# Patient Record
Sex: FEMALE | NOT HISPANIC OR LATINO | Employment: FULL TIME | URBAN - METROPOLITAN AREA
[De-identification: names, ages, dates, MRNs, and addresses within clinical notes are randomized per-mention and may not be internally consistent; named-entity substitution may affect disease eponyms.]

---

## 2017-06-14 ENCOUNTER — HOSPITAL ENCOUNTER (EMERGENCY)
Facility: HOSPITAL | Age: 42
Discharge: HOME | End: 2017-06-14
Attending: EMERGENCY MEDICINE
Payer: COMMERCIAL

## 2017-06-14 ENCOUNTER — APPOINTMENT (EMERGENCY)
Dept: ULTRASOUND IMAGING | Facility: HOSPITAL | Age: 42
End: 2017-06-14
Payer: COMMERCIAL

## 2017-06-14 VITALS
DIASTOLIC BLOOD PRESSURE: 72 MMHG | TEMPERATURE: 97.8 F | HEART RATE: 87 BPM | HEIGHT: 64 IN | SYSTOLIC BLOOD PRESSURE: 134 MMHG | WEIGHT: 144 LBS | BODY MASS INDEX: 24.59 KG/M2 | RESPIRATION RATE: 18 BRPM | OXYGEN SATURATION: 100 %

## 2017-06-14 DIAGNOSIS — M79.605 ACUTE PAIN OF LEFT LOWER EXTREMITY: Primary | ICD-10-CM

## 2017-06-14 LAB
ANION GAP BLD CALC-SCNC: 16 MMOL/L (ref 4–13)
BUN BLD-MCNC: 16 MG/DL (ref 5–25)
CA-I BLD-SCNC: 1.19 MMOL/L (ref 1.12–1.32)
CHLORIDE BLD-SCNC: 104 MMOL/L (ref 100–108)
CREAT BLD-MCNC: 0.6 MG/DL (ref 0.6–1.3)
DEPRECATED D DIMER PPP: 440 NG/ML (FEU) (ref 0–424)
GFR SERPL CREATININE-BSD FRML MDRD: >60 ML/MIN/1.73SQ M
GLUCOSE SERPL-MCNC: 136 MG/DL (ref 65–140)
HCT VFR BLD CALC: 37 % (ref 34.8–46.1)
HGB BLDA-MCNC: 12.6 G/DL (ref 11.5–15.4)
PCO2 BLD: 25 MMOL/L (ref 21–32)
POTASSIUM BLD-SCNC: 4 MMOL/L (ref 3.5–5.3)
SODIUM BLD-SCNC: 140 MMOL/L (ref 136–145)
SPECIMEN SOURCE: ABNORMAL

## 2017-06-14 PROCEDURE — 36415 COLL VENOUS BLD VENIPUNCTURE: CPT | Performed by: PHYSICIAN ASSISTANT

## 2017-06-14 PROCEDURE — 80047 BASIC METABLC PNL IONIZED CA: CPT

## 2017-06-14 PROCEDURE — 93971 EXTREMITY STUDY: CPT

## 2017-06-14 PROCEDURE — 99284 EMERGENCY DEPT VISIT MOD MDM: CPT

## 2017-06-14 PROCEDURE — 85014 HEMATOCRIT: CPT

## 2017-06-14 PROCEDURE — 85379 FIBRIN DEGRADATION QUANT: CPT | Performed by: PHYSICIAN ASSISTANT

## 2017-06-16 ENCOUNTER — ALLSCRIPTS OFFICE VISIT (OUTPATIENT)
Dept: OTHER | Facility: OTHER | Age: 42
End: 2017-06-16

## 2017-07-13 ENCOUNTER — TRANSCRIBE ORDERS (OUTPATIENT)
Dept: ADMINISTRATIVE | Facility: HOSPITAL | Age: 42
End: 2017-07-13

## 2017-07-13 ENCOUNTER — APPOINTMENT (OUTPATIENT)
Dept: LAB | Facility: HOSPITAL | Age: 42
End: 2017-07-13

## 2017-07-13 DIAGNOSIS — Z00.8 HEALTH EXAMINATION IN POPULATION SURVEY: Primary | ICD-10-CM

## 2017-07-13 DIAGNOSIS — Z00.8 HEALTH EXAMINATION IN POPULATION SURVEY: ICD-10-CM

## 2017-07-13 LAB
CHOLEST SERPL-MCNC: 218 MG/DL (ref 50–200)
EST. AVERAGE GLUCOSE BLD GHB EST-MCNC: 120 MG/DL
HBA1C MFR BLD: 5.8 % (ref 4.2–6.3)
HDLC SERPL-MCNC: 44 MG/DL (ref 40–60)
LDLC SERPL CALC-MCNC: 159 MG/DL (ref 0–100)
TRIGL SERPL-MCNC: 77 MG/DL

## 2017-07-13 PROCEDURE — 80061 LIPID PANEL: CPT

## 2017-07-13 PROCEDURE — 83036 HEMOGLOBIN GLYCOSYLATED A1C: CPT

## 2017-07-13 PROCEDURE — 36415 COLL VENOUS BLD VENIPUNCTURE: CPT

## 2017-08-14 ENCOUNTER — ALLSCRIPTS OFFICE VISIT (OUTPATIENT)
Dept: OTHER | Facility: OTHER | Age: 42
End: 2017-08-14

## 2017-09-26 ENCOUNTER — OFFICE VISIT (OUTPATIENT)
Dept: URGENT CARE | Facility: MEDICAL CENTER | Age: 42
End: 2017-09-26
Payer: COMMERCIAL

## 2017-09-26 PROCEDURE — 99213 OFFICE O/P EST LOW 20 MIN: CPT

## 2017-09-26 PROCEDURE — S9088 SERVICES PROVIDED IN URGENT: HCPCS

## 2017-10-13 DIAGNOSIS — D25.9 LEIOMYOMA OF UTERUS: ICD-10-CM

## 2017-10-13 DIAGNOSIS — Z12.31 ENCOUNTER FOR SCREENING MAMMOGRAM FOR MALIGNANT NEOPLASM OF BREAST: ICD-10-CM

## 2017-10-13 DIAGNOSIS — R10.2 PELVIC AND PERINEAL PAIN: ICD-10-CM

## 2017-10-13 DIAGNOSIS — D64.9 ANEMIA: ICD-10-CM

## 2017-10-13 DIAGNOSIS — R73.03 PREDIABETES: ICD-10-CM

## 2017-10-20 ENCOUNTER — GENERIC CONVERSION - ENCOUNTER (OUTPATIENT)
Dept: FAMILY MEDICINE CLINIC | Facility: CLINIC | Age: 42
End: 2017-10-20

## 2017-10-25 ENCOUNTER — TRANSCRIBE ORDERS (OUTPATIENT)
Dept: LAB | Facility: HOSPITAL | Age: 42
End: 2017-10-25

## 2017-10-25 ENCOUNTER — APPOINTMENT (OUTPATIENT)
Dept: LAB | Facility: HOSPITAL | Age: 42
End: 2017-10-25
Attending: FAMILY MEDICINE
Payer: COMMERCIAL

## 2017-10-25 DIAGNOSIS — R73.03 PREDIABETES: ICD-10-CM

## 2017-10-25 DIAGNOSIS — H93.A1 PULSATILE TINNITUS OF RIGHT EAR: Primary | ICD-10-CM

## 2017-10-25 DIAGNOSIS — H93.A1 PULSATILE TINNITUS OF RIGHT EAR: ICD-10-CM

## 2017-10-25 LAB
ALBUMIN SERPL BCP-MCNC: 3.4 G/DL (ref 3.5–5)
ALP SERPL-CCNC: 87 U/L (ref 46–116)
ALT SERPL W P-5'-P-CCNC: 23 U/L (ref 12–78)
ANION GAP SERPL CALCULATED.3IONS-SCNC: 7 MMOL/L (ref 4–13)
AST SERPL W P-5'-P-CCNC: 18 U/L (ref 5–45)
BASOPHILS # BLD AUTO: 0.06 THOUSANDS/ΜL (ref 0–0.1)
BASOPHILS NFR BLD AUTO: 1 % (ref 0–1)
BILIRUB SERPL-MCNC: 0.3 MG/DL (ref 0.2–1)
BUN SERPL-MCNC: 14 MG/DL (ref 5–25)
CALCIUM SERPL-MCNC: 8.6 MG/DL (ref 8.3–10.1)
CHLORIDE SERPL-SCNC: 105 MMOL/L (ref 100–108)
CHOLEST SERPL-MCNC: 178 MG/DL (ref 50–200)
CO2 SERPL-SCNC: 27 MMOL/L (ref 21–32)
CREAT SERPL-MCNC: 0.69 MG/DL (ref 0.6–1.3)
EOSINOPHIL # BLD AUTO: 0.13 THOUSAND/ΜL (ref 0–0.61)
EOSINOPHIL NFR BLD AUTO: 3 % (ref 0–6)
ERYTHROCYTE [DISTWIDTH] IN BLOOD BY AUTOMATED COUNT: 16.5 % (ref 11.6–15.1)
EST. AVERAGE GLUCOSE BLD GHB EST-MCNC: 134 MG/DL
GFR SERPL CREATININE-BSD FRML MDRD: 123 ML/MIN/1.73SQ M
GLUCOSE P FAST SERPL-MCNC: 95 MG/DL (ref 65–99)
HBA1C MFR BLD: 6.3 % (ref 4.2–6.3)
HCT VFR BLD AUTO: 30 % (ref 34.8–46.1)
HDLC SERPL-MCNC: 46 MG/DL (ref 40–60)
HGB BLD-MCNC: 9.3 G/DL (ref 11.5–15.4)
LDLC SERPL CALC-MCNC: 118 MG/DL (ref 0–100)
LYMPHOCYTES # BLD AUTO: 1.47 THOUSANDS/ΜL (ref 0.6–4.47)
LYMPHOCYTES NFR BLD AUTO: 31 % (ref 14–44)
MCH RBC QN AUTO: 20.9 PG (ref 26.8–34.3)
MCHC RBC AUTO-ENTMCNC: 31 G/DL (ref 31.4–37.4)
MCV RBC AUTO: 67 FL (ref 82–98)
MONOCYTES # BLD AUTO: 0.42 THOUSAND/ΜL (ref 0.17–1.22)
MONOCYTES NFR BLD AUTO: 9 % (ref 4–12)
NEUTROPHILS # BLD AUTO: 2.61 THOUSANDS/ΜL (ref 1.85–7.62)
NEUTS SEG NFR BLD AUTO: 56 % (ref 43–75)
NRBC BLD AUTO-RTO: 0 /100 WBCS
PLATELET # BLD AUTO: 410 THOUSANDS/UL (ref 149–390)
PMV BLD AUTO: 10.8 FL (ref 8.9–12.7)
POTASSIUM SERPL-SCNC: 4.2 MMOL/L (ref 3.5–5.3)
PROT SERPL-MCNC: 7.6 G/DL (ref 6.4–8.2)
RBC # BLD AUTO: 4.46 MILLION/UL (ref 3.81–5.12)
SODIUM SERPL-SCNC: 139 MMOL/L (ref 136–145)
TRIGL SERPL-MCNC: 72 MG/DL
TSH SERPL DL<=0.05 MIU/L-ACNC: 1.68 UIU/ML (ref 0.36–3.74)
WBC # BLD AUTO: 4.7 THOUSAND/UL (ref 4.31–10.16)

## 2017-10-25 PROCEDURE — 80061 LIPID PANEL: CPT

## 2017-10-25 PROCEDURE — 84443 ASSAY THYROID STIM HORMONE: CPT

## 2017-10-25 PROCEDURE — 80053 COMPREHEN METABOLIC PANEL: CPT

## 2017-10-25 PROCEDURE — 83036 HEMOGLOBIN GLYCOSYLATED A1C: CPT

## 2017-10-25 PROCEDURE — 36415 COLL VENOUS BLD VENIPUNCTURE: CPT

## 2017-10-25 PROCEDURE — 85025 COMPLETE CBC W/AUTO DIFF WBC: CPT

## 2017-10-27 ENCOUNTER — ALLSCRIPTS OFFICE VISIT (OUTPATIENT)
Dept: OTHER | Facility: OTHER | Age: 42
End: 2017-10-27

## 2017-10-27 PROCEDURE — 87086 URINE CULTURE/COLONY COUNT: CPT | Performed by: NURSE PRACTITIONER

## 2017-10-28 ENCOUNTER — HOSPITAL ENCOUNTER (OUTPATIENT)
Dept: ULTRASOUND IMAGING | Facility: HOSPITAL | Age: 42
Discharge: HOME | End: 2017-10-28
Attending: OBSTETRICS & GYNECOLOGY
Payer: COMMERCIAL

## 2017-10-28 ENCOUNTER — LAB REQUISITION (OUTPATIENT)
Dept: LAB | Facility: HOSPITAL | Age: 42
End: 2017-10-28
Payer: COMMERCIAL

## 2017-10-28 DIAGNOSIS — D25.9 LEIOMYOMA OF UTERUS: ICD-10-CM

## 2017-10-28 DIAGNOSIS — R35.0 FREQUENCY OF MICTURITION: ICD-10-CM

## 2017-10-28 PROCEDURE — 76856 US EXAM PELVIC COMPLETE: CPT

## 2017-10-28 NOTE — PROGRESS NOTES
Assessment  1  Fibroids (218 9) (D25 9)   2  Anemia (285 9) (D64 9)   3  Urinary frequency (788 41) (R35 0)    Plan  Anemia    · (1) FOLATE; Status:Active; Requested ZCK:[id number];    Perform:[facility]; NWY:[id number]; Ordered; Edda Guzman; Ordered By:Roel Kaufman;   · (1) IRON PANEL; Status:Active; Requested EJW:[id number];    Perform:[facility]; JCB:[id number]; Ordered; Selmal Plank; Ordered By:Roel Kaufman;   · (1) OCCULT BLOOD,FECES(SCREEN); Status:Active; Requested XDF:[id number];    Perform:[facility]; KSR:84YFV4232; Ordered; Edda Guzman; Ordered By:Roel Kaufman;   · (1) VITAMIN B12; Status:Active; Requested VYE:[id number];    Perform:[facility]; KSY:[id number]; Ordered; Selmal Plank; Ordered By:Roel Kaufman;  Fibroids    · * US PELVIS COMPLETE (TRANSABDOMINAL AND TRANSVAGINAL); Status:Hold For -  Scheduling; Requested VRH:[id number];    Perform:[facility]; BTP:[id number]; Ordered; For:Fibroids; Ordered By:Lisa Teixeira;  Urinary frequency    · (1) URINE CULTURE; Source:Urine, Clean Catch; Status: In Progress - Specimen/Data  Collected;   Done: 68LLD5116   Perform:[facility]; QCF:[id number]; Ordered; For:Urinary frequency; Ordered By:Mata Teixeira; Discussion/Summary  Goals and Barriers: The patient has the current Goals: Improve anemia  The patent has the current Barriers: Patient has large fibroid  Patient's Capacity to Self-Care: Patient is able to Self-Care  Medication SE Review and Pt Understands Tx: Possible side effects of new medications were reviewed with the patient/guardian today  The treatment plan was reviewed with the patient/guardian  The patient/guardian understands and agrees with the treatment plan   Self Referrals:   Self Referrals: Yes      Chief Complaint  Chief Complaint Free Text Note Form: Patient here for fibroids stating periods are heavy the first 2 -3 days lasting 4-7 days        History of Present Illness  HPI: Pleasant 18-year-old here for discussion of fibroids  She had ultrasound done at [facility] last year which showed a 5 6 cm fibroid  She has been having flooding and pronounced anemia recently  She is interested in definitive treatment but would like to keep her uterus and avoid hormones  She also has been having increased urinary frequency which she attributes to the enlarged fibroid  Review of Systems  Focused-Female:   Constitutional: No fever, no chills, feels well, no tiredness, no recent weight gain or loss  Gastrointestinal: no complaints of abdominal pain, no constipation, no nausea or diarrhea, no vomiting, no bloody stools  Genitourinary: no complaints of dysuria, no incontinence, no pelvic pain, no dysmenorrhea, no vaginal discharge or abnormal vaginal bleeding  ROS Reviewed:   ROS reviewed  Active Problems  1  Anemia (285 9) (D64 9)   2  Fibroids (218 9) (D25 9)   3  Hypercholesteremia (272 0) (E78 00)   4  Lesion of lip (528 5) (K13 0)   5  Pelvic pain in female (625 9) (R10 2)   6  Pre-diabetes (790 29) (R73 03)   7  Pulsatile tinnitus of right ear (388 30) (H93 A1)    Past Medical History  1  History of sickle cell trait (V12 3) (Z86 2)  Active Problems And Past Medical History Reviewed: The active problems and past medical history were reviewed and updated today  Surgical History  1  History of Hernia Repair  Surgical History Reviewed: The surgical history was reviewed and updated today  Family History  Mother    1  Family history of thyroid disease (V18 19) (Z83 49)  Father    2  Family history of diabetes mellitus (V18 0) (Z83 3)   3  Family history of hypertension (V17 49) (Z82 49)  Son    4  Family history of asthma (V17 5) (Z82 5)   5  Family history of eczema (V19 4) (Z84 0)  Family History Reviewed: The family history was reviewed and updated today    Patient denies family history of breast, colon or GYN cancers      Social History   · Never a smoker   · Not currently sexually active   · Social alcohol use (Z78 9)  Social History Reviewed: The social history was reviewed and updated today  Patient works in the [employer]      Current Meds   1  Flonase SUSP; Therapy: (Recorded:27Oct2017) to Recorded   2  Multi-Vitamin TABS; Therapy: (Recorded:24Kgo8631) to Recorded  Medication List Reviewed: The medication list was reviewed and updated today  Allergies  1  No Known Drug Allergies    Vitals  Vital Signs    Recorded: 45VTX1632 74:60SJ   Systolic 485, RUE, Sitting   Diastolic 80, RUE, Sitting   Height 5 ft 3 in   Weight 137 lb    BMI Calculated 24 27   BSA Calculated 1 65   LMP 16Oct2017     Physical Exam    Constitutional   General appearance: No acute distress, well appearing and well nourished  Pulmonary   Respiratory effort: No increased work of breathing or signs of respiratory distress  Genitourinary   External genitalia: Normal and no lesions appreciated  Vagina: Normal, no lesions or dryness appreciated  Urethral meatus: Normal     Bladder: Normal, soft, non-tender and no prolapse or masses appreciated  Cervix: Normal, no palpable masses  Uterus: Abnormal  -- Enlarged with large fibroid out  Adnexa/parametria: Normal, non-tender and no fullness or masses appreciated      Psychiatric   Orientation to person, place, and time: Normal     Mood and affect: Normal        Results/Data  (1) COMPREHENSIVE METABOLIC PANEL 45VCK9607 25:16YQ [facility] Standard    Order Number: [id number]     Test Name Result Flag Reference   SODIUM 139 mmol/L  136-145   POTASSIUM 4 2 mmol/L  3 5-5 3   CHLORIDE 105 mmol/L  100-108   CARBON DIOXIDE 27 mmol/L  21-32   ANION GAP (CALC) 7 mmol/L  4-13   BLOOD UREA NITROGEN 14 mg/dL  5-25   CREATININE 0 69 mg/dL  0 60-1 30   Standardized to IDMS reference method   CALCIUM 8 6 mg/dL  8 3-10 1   BILI, TOTAL 0 30 mg/dL  0 20-1 00   ALK PHOSPHATAS 87 U/L     ALT (SGPT) 23 U/L  12-78   Specimen collection should occur prior to Sulfasalazine administration due to the potential for falsely depressed results  AST(SGOT) 18 U/L  5-45   Specimen collection should occur prior to Sulfasalazine administration due to the potential for falsely depressed results  ALBUMIN 3 4 g/dL L 3 5-5 0   TOTAL PROTEIN 7 6 g/dL  6 4-8 2   eGFR 123 ml/min/1 73sq m     [facility] Disease Education Program recommendations are as follows:  GFR calculation is accurate only with a steady state creatinine  Chronic Kidney disease less than 60 ml/min/1 73 sq  meters  Kidney failure less than 15 ml/min/1 73 sq  meters  GLUCOSE FASTING 95 mg/dL  65-99   Specimen collection should occur prior to Sulfasalazine administration due to the potential for falsely depressed results  Specimen collection should occur prior to Sulfapyridine administration due to the potential for falsely elevated results  (1) HEMOGLOBIN A1C 25Oct2017 06:31AM Shana Rg    Order Number: [id number]     Test Name Result Flag Reference   HEMOGLOBIN A1C 6 3 %  4 2-6 3   EST  AVG  GLUCOSE 134 mg/dl       (1) LIPID PANEL, FASTING 71ZDJ0496 06:31AM Shana Rg    Order Number: [id number]     Test Name Result Flag Reference   CHOLESTEROL 178 mg/dL     HDL,DIRECT 46 mg/dL  40-60   Specimen collection should occur prior to Metamizole administration due to the potential for falsley depressed results  LDL CHOLESTEROL CALCULATED 118 mg/dL H 0-100   Triglyceride:        Normal <150 mg/dl   Borderline High 150-199 mg/dl   High 200-499 mg/dl   Very High >499 mg/dl      Cholesterol:       Desirable <200 mg/dl    Borderline High 200-239 mg/dl    High >239 mg/dl      HDL Cholesterol:       High>59 mg/dL    Low <41 mg/dL      This screening LDL is a calculated result  It does not have the accuracy of the Direct Measured LDL in the monitoring of patients with hyperlipidemia and/or statin therapy     Direct Measure LDL (XDW967) must be ordered separately in these patients  TRIGLYCERIDES 72 mg/dL  <=150   Specimen collection should occur prior to N-Acetylcysteine or Metamizole administration due to the potential for falsely depressed results         Signatures   Electronically signed by : Sanket Wolf; Oct 27 2017 11:13AM EST                       (Author)    Electronically signed by : Carmen Fine MD; Oct 27 2017 11:14AM EST

## 2017-10-29 LAB — BACTERIA UR CULT: NORMAL

## 2017-10-30 ENCOUNTER — GENERIC CONVERSION - ENCOUNTER (OUTPATIENT)
Dept: OTHER | Facility: OTHER | Age: 42
End: 2017-10-30

## 2017-10-31 ENCOUNTER — GENERIC CONVERSION - ENCOUNTER (OUTPATIENT)
Dept: OTHER | Facility: OTHER | Age: 42
End: 2017-10-31

## 2017-11-03 ENCOUNTER — ALLSCRIPTS OFFICE VISIT (OUTPATIENT)
Dept: OTHER | Facility: OTHER | Age: 42
End: 2017-11-03

## 2017-11-04 ENCOUNTER — APPOINTMENT (OUTPATIENT)
Dept: LAB | Facility: HOSPITAL | Age: 42
End: 2017-11-04
Attending: FAMILY MEDICINE
Payer: COMMERCIAL

## 2017-11-04 DIAGNOSIS — D64.9 ANEMIA: ICD-10-CM

## 2017-11-04 LAB
FERRITIN SERPL-MCNC: 2 NG/ML (ref 8–388)
FOLATE SERPL-MCNC: >20 NG/ML (ref 3.1–17.5)
IRON SATN MFR SERPL: 4 %
IRON SERPL-MCNC: 19 UG/DL (ref 50–170)
TIBC SERPL-MCNC: 423 UG/DL (ref 250–450)
VIT B12 SERPL-MCNC: 405 PG/ML (ref 100–900)

## 2017-11-04 PROCEDURE — 36415 COLL VENOUS BLD VENIPUNCTURE: CPT

## 2017-11-04 PROCEDURE — 83540 ASSAY OF IRON: CPT

## 2017-11-04 PROCEDURE — 82746 ASSAY OF FOLIC ACID SERUM: CPT

## 2017-11-04 PROCEDURE — 82728 ASSAY OF FERRITIN: CPT

## 2017-11-04 PROCEDURE — 83550 IRON BINDING TEST: CPT

## 2017-11-04 PROCEDURE — 82607 VITAMIN B-12: CPT

## 2017-11-04 NOTE — PROGRESS NOTES
Assessment  1  Anemia (285 9) (D64 9)    Discussion/Summary    Anemia- unclear etiology, B- 12, Iron, Folic acid, Occult blood ordered  If all negative consider Bleeding related to Uterine fibroids she was advised to follow up with OB/Gyn was advised increasing iron in her diet and increase meat consumption as it might help her anemia  up in 1 month  Possible side effects of new medications were reviewed with the patient/guardian today  The treatment plan was reviewed with the patient/guardian  The patient/guardian understands and agrees with the treatment plan      Chief Complaint  Patient seen in office today for a follow up appointment with review of labs - no new c/o  History of Present Illness  Patient is here to follow up for blood work showing anemia, she says that she has been found to be anemic in the past  She has a history of sickle cell anemia trait  She denies any blood in the urine or in the stool  She has a history of a fibroid and does have a lot of bleeding associated with the fibroid  She says that her her periods are very heavy and out of control  She says that at one point her periods were lasting 7-10 days  Review of Systems    Constitutional: as noted in HPI  Eyes: No complaints of eye pain, no red eyes, no eyesight problems, no discharge, no dry eyes, no itching of eyes  Cardiovascular: No complaints of slow heart rate, no fast heart rate, no chest pain, no palpitations, no leg claudication, no lower extremity edema  Respiratory: No complaints of shortness of breath, no wheezing, no cough, no SOB on exertion, no orthopnea, no PND  Gastrointestinal: No complaints of abdominal pain, no constipation, no nausea or vomiting, no diarrhea, no bloody stools  Genitourinary: No complaints of dysuria, no incontinence, no pelvic pain, no dysmenorrhea, no vaginal discharge or bleeding     Musculoskeletal: No complaints of arthralgias, no myalgias, no joint swelling or stiffness, no limb pain or swelling  Integumentary: No complaints of skin rash or lesions, no itching, no skin wounds, no breast pain or lump  Endocrine: No complaints of proptosis, no hot flashes, no muscle weakness, no deepening of the voice, no feelings of weakness  Hematologic/Lymphatic: as noted in HPI  ROS reviewed  Active Problems  1  Anemia (285 9) (D64 9)   2  Fibroids (218 9) (D25 9)   3  Hypercholesteremia (272 0) (E78 00)   4  Lesion of lip (528 5) (K13 0)   5  Pelvic pain in female (625 9) (R10 2)   6  Pre-diabetes (790 29) (R73 03)   7  Pulsatile tinnitus of right ear (388 30) (H93 A1)   8  Urinary frequency (788 41) (R35 0)    Past Medical History  1  History of sickle cell trait (V12 3) (Z86 2)    The active problems and past medical history were reviewed and updated today  Surgical History  1  History of Hernia Repair    Family History  Mother    1  Family history of thyroid disease (V18 19) (Z83 49)  Father    2  Family history of diabetes mellitus (V18 0) (Z83 3)   3  Family history of hypertension (V17 49) (Z82 49)  Son    4  Family history of asthma (V17 5) (Z82 5)   5  Family history of eczema (V19 4) (Z84 0)    Social History   · Never a smoker   · Not currently sexually active   · Social alcohol use (Z78 9)    Current Meds   1  Multi-Vitamin TABS; Therapy: (Recorded:62Ivw5934) to Recorded    Allergies  1  No Known Drug Allergies    Vitals  Vital Signs    Recorded: 80YKO8687 08:13AM   Temperature 97 8 F   Heart Rate 91   Systolic 044   Diastolic 78   Height 5 ft 3 in   Weight 137 lb    BMI Calculated 24 27   BSA Calculated 1 65   O2 Saturation 100     Physical Exam    Constitutional   General appearance: No acute distress, well appearing and well nourished  Eyes   Conjunctiva and lids: No swelling, erythema or discharge  Pulmonary   Respiratory effort: No increased work of breathing or signs of respiratory distress  Auscultation of lungs: Clear to auscultation  Cardiovascular   Auscultation of heart: Normal rate and rhythm, normal S1 and S2, without murmurs  Musculoskeletal   Gait and station: Normal     Skin   Skin and subcutaneous tissue: Normal without rashes or lesions      Psychiatric   Orientation to person, place, and time: Normal     Mood and affect: Normal          Future Appointments    Date/Time Provider Specialty Site   11/06/2017 09:45 AM Amanda James RD, LDN Diabetes Educator [facility]   12/04/2017 11:15 AM Sherrie Werner MD Obstetrics/Gynecology [facility]     Signatures   Electronically signed by : Cruz Trevizo MD; Nov  3 2017  8:36AM EST                       (Author)

## 2017-12-04 ENCOUNTER — ALLSCRIPTS OFFICE VISIT (OUTPATIENT)
Dept: OTHER | Facility: OTHER | Age: 42
End: 2017-12-04

## 2017-12-04 DIAGNOSIS — N92.0 EXCESSIVE AND FREQUENT MENSTRUATION WITH REGULAR CYCLE: ICD-10-CM

## 2017-12-04 PROCEDURE — 88305 TISSUE EXAM BY PATHOLOGIST: CPT | Performed by: OBSTETRICS & GYNECOLOGY

## 2017-12-05 NOTE — PROGRESS NOTES
Assessment    1  Fibroids (218 9) (D25 9)   2  Menorrhagia with regular cycle (626 2) (N92 0)   3  Anemia (285 9) (D64 9)    Discussion/Summary  Discussion Summary:   Reviewed treatment options for menorrhagia  Discussed hormonal treatment such as birth control pills and Depo-Provera through surgical treatments such as D&C hysteroscopy with ablation, hysterectomy and uterine artery embolization  At this time the patient would like to avoid hormonal treatments  And is not interested in hysterectomy  After review of risks and benefits of all above options patient would like to proceed with a D&C hysteroscopy with NovaSure ablation  D&C Hysteroscopy Novasure ablationCBCnoneprophylaxis bilateral compression boots  Counseling Documentation With Imm: The patient was counseled regarding diagnostic results,-- instructions for management,-- prognosis,-- risks and benefits of treatment options  total time of encounter was 30 minutes-- and-- 20 minutes was spent counseling  Goals and Barriers: The patient has the current Goals: Treatment of menorrhagia  The patent has the current Barriers: None  Patient's Capacity to Self-Care: Patient is able to Self-Care  Patient Education: Educational resources provided: [employer] educational pamphlets-fibroids, hysterectomy and NovaSure ablation  [facility] Infection prevention pamphlet  Medication SE Review and Pt Understands Tx: Possible side effects of new medications were reviewed with the patient/guardian today  The treatment plan was reviewed with the patient/guardian  The patient/guardian understands and agrees with the treatment plan   Self Referrals:   Self Referrals: No      Chief Complaint  Chief Complaint Free Text Note Form: Patient here for consult:fibroids      History of Present Illness  HPI: 35year old  with menorrhagia  Was evaluated 1 month ago and an ultrasound was obtained showing 7cm fundal fibroid  Here to discuss options        Review of Systems   Female ROS: dysmenorrhea-- and-- menorrhagia, but-- no pelvic pain,-- no decreased time between periods,-- periods are regular-- and-- regular length of periods--   The patient presents with complaints of gradual onset of 1 episode of severe nonmenstrual bleeding, described as heavy starting 2 years ago  Episodes have been occurring 1 time a month, each episode lasting 5 days  She is currently experiencing nonmenstrual bleeding  Her symptoms are caused by no known event  Symptoms are improved by lying down and reduced activity, but not by stress management and heating pad  Symptoms are made worse by strenuous activity and prolonged standing, but not by intercourse and stress  Symptoms are worsening  Focused-Female:  Constitutional: No fever, no chills, feels well, no tiredness, no recent weight gain or loss  ENT: no ear ache, no loss of hearing, no nosebleeds or nasal discharge, no sore throat or hoarseness  Cardiovascular: no complaints of slow or fast heart rate, no chest pain, no palpitations, no leg claudication or lower extremity edema  Respiratory: no complaints of shortness of breath, no wheezing, no dyspnea on exertion, no orthopnea or PND  Breasts: no complaints of breast pain, breast lump or nipple discharge  Gastrointestinal: no complaints of abdominal pain, no constipation, no nausea or diarrhea, no vomiting, no bloody stools  Genitourinary: no complaints of dysuria, no incontinence, no pelvic pain, no dysmenorrhea, no vaginal discharge or abnormal vaginal bleeding  Musculoskeletal: no complaints of arthralgia, no myalgia, no joint swelling or stiffness, no limb pain or swelling  Integumentary: no complaints of skin rash or lesion, no itching or dry skin, no skin wounds  Neurological: no complaints of headache, no confusion, no numbness or tingling, no dizziness or fainting  Active Problems  1  Anemia (285 9) (D64 9)   2  Fibroids (218 9) (D25 9)   3  Hypercholesteremia (272 0) (E78 00)   4  Iron deficiency anemia (280 9) (D50 9)   5  Lesion of lip (528 5) (K13 0)   6  Pelvic pain in female (625 9) (R10 2)   7  Pre-diabetes (790 29) (R73 03)   8  Pulsatile tinnitus of right ear (388 30) (H93 A1)   9  Urinary frequency (788 41) (R35 0)    Past Medical History  1  History of sickle cell trait (V12 3) (Z86 2)    Surgical History  1  History of Hernia Repair    Family History  Mother    1  Family history of thyroid disease (V18 19) (Z83 49)  Father    2  Family history of diabetes mellitus (V18 0) (Z83 3)   3  Family history of hypertension (V17 49) (Z82 49)  Son    4  Family history of asthma (V17 5) (Z82 5)   5  Family history of eczema (V19 4) (Z84 0)    Social History     · Never a smoker   · Not currently sexually active   · Social alcohol use (Z78 9)    Current Meds   1  Ferrous Sulfate 325 (65 Fe) MG Oral Tablet; TAKE 1 TABLET 3 TIMES DAILY WITH FOOD; Therapy: 73BYD7962 to (Evaluate:06Jan2018)  Requested for: 82GVR5356; Last Rx:07Nov2017 Ordered   2  Multi-Vitamin TABS; Therapy: (Recorded:64Xsl3992) to Recorded    Allergies  1  No Known Drug Allergies    Vitals  Vital Signs    Recorded: 98FCO6636 36:97QP   Systolic 617   Diastolic 74   Height 5 ft 3 in   Weight 139 lb    BMI Calculated 24 62   BSA Calculated 1 66       Physical Exam   Constitutional  General appearance: No acute distress, well appearing and well nourished  Pulmonary  Respiratory effort: No increased work of breathing or signs of respiratory distress  Auscultation of lungs: Clear to auscultation  Cardiovascular  Auscultation of heart: Normal rate and rhythm, normal S1 and S2, no murmurs  Peripheral vascular exam: Normal pulses Throughout  Genitourinary  External genitalia: Normal and no lesions appreciated  Vagina: Normal, no lesions or dryness appreciated  Urethra: Normal    Urethral meatus: Normal    Bladder: Normal, soft, non-tender and no prolapse or masses appreciated  Cervix: Normal, no palpable masses     Uterus: Abnormal   The uterus was enlarged 14 weeks-- and-- found to have a 8 cm palpable mass, but-- nontender  Adnexa/parametria: Normal, non-tender and no fullness or masses appreciated  Results/Data  US PELVIS  TRANSABDOMINAL ONLY 46GFJ8471 09:27AM Ellen Melissa Order Number: [id number]   - Patient Instructions: To schedule this appointment, please contact Central Scheduling at [phone]  Test Name Result Flag Reference   US PELVIS  TRANSABDOMINAL ONLY (Report)       PELVIC ULTRASOUND, COMPLETE   INDICATION: Fibroid uterus, frequent urination        COMPARISON: None  TECHNIQUE:  Transabdominal pelvic ultrasound was performed in sagittal and transverse planes with a curvilinear transducer  Additional transvaginal imaging was performed to better evaluate the endometrium and ovaries  Imaging included volumetric   sweeps as well as traditional still imaging technique  FINDINGS:   UTERUS:  The uterus is anteverted in position, measuring 12 7 x 9 6 x 9 1 cm  Contour and echotexture appear normal  There is a large mass seen in the fundus region, measuring 7 8 x 7 4 x 5 8 cm  Vascularity is noted within this mass  The cervix shows no suspicious abnormality  ENDOMETRIUM:   Normal caliber of 5 2 mm  Homogenous and normal in appearance  OVARIES/ADNEXA:  Right ovary: 2 9 x 2 1 x 2 3 cm  No suspicious right ovarian abnormality  Doppler flow within normal limits  Left ovary: 3 0 x 2 0 x 3 3 cm  No suspicious left ovarian abnormality  Doppler flow within normal limits  No suspicious adnexal mass or loculated collections  There is no free fluid  IMPRESSION:    There is a large mass in the fundus region which measures 7 8 x 7 4 x 5 8 cm  Suggest a fibroid  No pelvic adnexal mass seen   Endometrial thickness measures 0 5 cm      There is mild distortion of the endometrial echo by large mass    Workstation performed: [id number]   Signed by:  Edith Anderson MD  10/28/17 Procedure   Procedure: Endometrial biopsy  Indication: abnormal uterine bleeding  Risks, benefits and alternatives were discussed with the patient  We discussed possible complications, including infection,-- bleeding,-- allergic reaction,-- uterine perforation-- and-- pain  written consent was obtained prior to the procedure and is detailed in the patient's record  Prior to the start of the procedure a time out was taken and the identity of the patient was confirmed via name and date of birth with the patient  The correct site and the procedure to be performed were confirmed  The positioning of the patient was verified  The availability of the correct equipment was verified  a pregnancy test was performed and confirmed negative  Procedure Note:  The cervix was prepped with betadine  Anesthesia: none  The cervix was stabilized with a single toothed tenaculum  The cervix allowed easy passage of a uterine sound without dilation  The uterus was in mid position-- and-- sounded to 7 cm  A Pipelle endometrial suction curette was inserted into the uterine cavity  Using a combination of suction and rotation, samples were obtained from  A scant amount of blood and tissue were obtained  the specimen was placed in buffered formalin and sent for pathology  Patient Status: the patient tolerated the procedure well  Complications: there were no complications        Signatures   Electronically signed by : Zeny Brandon MD; Dec  4 2017  1:16PM EST                       (Author)

## 2017-12-06 ENCOUNTER — LAB REQUISITION (OUTPATIENT)
Dept: LAB | Facility: HOSPITAL | Age: 42
End: 2017-12-06
Payer: COMMERCIAL

## 2017-12-06 DIAGNOSIS — N92.0 EXCESSIVE AND FREQUENT MENSTRUATION WITH REGULAR CYCLE: ICD-10-CM

## 2017-12-11 ENCOUNTER — GENERIC CONVERSION - ENCOUNTER (OUTPATIENT)
Dept: OTHER | Facility: OTHER | Age: 42
End: 2017-12-11

## 2017-12-21 ENCOUNTER — GENERIC CONVERSION - ENCOUNTER (OUTPATIENT)
Dept: OTHER | Facility: OTHER | Age: 42
End: 2017-12-21

## 2018-01-10 NOTE — RESULT NOTES
Verified Results  (1) URINE CULTURE 27Oct2017 11:05AM Rossyamparo Olsonchicho     Test Name Result Flag Reference   CLINICAL REPORT (Report)     Test:        Urine culture  Specimen Source:  Urine, Other  Specimen Type:   Urine  Specimen Date:   10/27/2017 11:05 AM  Result Date:    10/29/2017 7:37 AM  Result Status:   Final result  Resulting Lab:    [address] [address]            Tel: [phone]      CULTURE                                       ------------------                                   No Growth <1000 cfu/mL

## 2018-01-11 NOTE — RESULT NOTES
Verified Results  [address] [facility] [id number] 09:27AM Thom Lazo Order Number: [id number]    - Patient Instructions: To schedule this appointment, please contact Central Scheduling at [phone]  Test Name Result Flag Reference   US PELVIS  TRANSABDOMINAL ONLY (Report)     PELVIC ULTRASOUND, COMPLETE     INDICATION: Fibroid uterus, frequent urination          COMPARISON: None  TECHNIQUE:  Transabdominal pelvic ultrasound was performed in sagittal and transverse planes with a curvilinear transducer  Additional transvaginal imaging was performed to better evaluate the endometrium and ovaries  Imaging included volumetric    sweeps as well as traditional still imaging technique  FINDINGS:     UTERUS:   The uterus is anteverted in position, measuring 12 7 x 9 6 x 9 1 cm  Contour and echotexture appear normal  There is a large mass seen in the fundus region, measuring 7 8 x 7 4 x 5 8 cm  Vascularity is noted within this mass   The cervix shows no suspicious abnormality  ENDOMETRIUM:    Normal caliber of 5 2 mm  Homogenous and normal in appearance  OVARIES/ADNEXA:   Right ovary: 2 9 x 2 1 x 2 3 cm  No suspicious right ovarian abnormality  Doppler flow within normal limits  Left ovary: 3 0 x 2 0 x 3 3 cm  No suspicious left ovarian abnormality  Doppler flow within normal limits  No suspicious adnexal mass or loculated collections  There is no free fluid  IMPRESSION:      There is a large mass in the fundus region which measures 7 8 x 7 4 x 5 8 cm  Suggest a fibroid  No pelvic adnexal mass seen     Endometrial thickness measures 0 5 cm        There is mild distortion of the endometrial echo by large mass       Workstation performed: [id number]     Signed by:   Fartun Duncan MD   10/28/17     (1) COMPREHENSIVE METABOLIC PANEL 82ZYJ7167 41:89GC Stephanie Alfaro Order Number: [id number]     Test Name Result Flag Reference SODIUM 139 mmol/L  136-145   POTASSIUM 4 2 mmol/L  3 5-5 3   CHLORIDE 105 mmol/L  100-108   CARBON DIOXIDE 27 mmol/L  21-32   ANION GAP (CALC) 7 mmol/L  4-13   BLOOD UREA NITROGEN 14 mg/dL  5-25   CREATININE 0 69 mg/dL  0 60-1 30   Standardized to [facility] reference method   CALCIUM 8 6 mg/dL  8 3-10 1   BILI, TOTAL 0 30 mg/dL  0 20-1 00   ALK PHOSPHATAS 87 U/L     ALT (SGPT) 23 U/L  12-78   Specimen collection should occur prior to Sulfasalazine administration due to the potential for falsely depressed results  AST(SGOT) 18 U/L  5-45   Specimen collection should occur prior to Sulfasalazine administration due to the potential for falsely depressed results  ALBUMIN 3 4 g/dL L 3 5-5 0   TOTAL PROTEIN 7 6 g/dL  6 4-8 2   eGFR 123 ml/min/1 73sq [facility] Disease Education Program recommendations are as follows:  GFR calculation is accurate only with a steady state creatinine  Chronic Kidney disease less than 60 ml/min/1 73 sq  meters  Kidney failure less than 15 ml/min/1 73 sq  meters  GLUCOSE FASTING 95 mg/dL  65-99   Specimen collection should occur prior to Sulfasalazine administration due to the potential for falsely depressed results  Specimen collection should occur prior to Sulfapyridine administration due to the potential for falsely elevated results  (1) HEMOGLOBIN A1C 25Oct2017 06:31AM [facility] Order Number: [id number]     Test Name Result Flag Reference   HEMOGLOBIN A1C 6 3 %  4 2-6 3   EST  AVG  GLUCOSE 134 mg/dl       (1) LIPID PANEL, FASTING 31NZU6001 06:31AM [facility] Order Number: [id number]     Test Name Result Flag Reference   CHOLESTEROL 178 mg/dL     HDL,DIRECT 46 mg/dL  40-60   Specimen collection should occur prior to Metamizole administration due to the potential for falsley depressed results     LDL CHOLESTEROL CALCULATED 118 mg/dL H 0-100   Triglyceride:        Normal <150 mg/dl   Borderline High 150-199 mg/dl   High 200-499 mg/dl   Very High >499 mg/dl      Cholesterol:       Desirable <200 mg/dl    Borderline High 200-239 mg/dl    High >239 mg/dl      HDL Cholesterol:       High>59 mg/dL    Low <41 mg/dL      This screening LDL is a calculated result  It does not have the accuracy of the Direct Measured LDL in the monitoring of patients with hyperlipidemia and/or statin therapy  Direct Measure LDL (VEX889) must be ordered separately in these patients  TRIGLYCERIDES 72 mg/dL  <=150   Specimen collection should occur prior to N-Acetylcysteine or Metamizole administration due to the potential for falsely depressed results  Plan  Anemia    · (1) FOLATE; Status:Active; Requested GTP:16IDV6247;    · (1) IRON PANEL; Status:Active; Requested AQS:40YXP8565;    · (1) OCCULT BLOOD,FECES(SCREEN); Status:Active; Requested YRS:71QCG3351;    · (1) VITAMIN B12; Status:Active; Requested for:26Oct2017;   Fibroids    · * US PELVIS COMPLETE (TRANSABDOMINAL AND TRANSVAGINAL); Status:Active; Requested NIT:28FCO1297;   Urinary frequency    · (1) URINE CULTURE; Source:Urine, Clean Catch; Status: In Progress - Specimen/Data  Collected;   Done: 17UUF4746

## 2018-01-11 NOTE — MISCELLANEOUS
Provider Comments  Provider Comments:   pt no show [phone]      Signatures   Electronically signed by : Amber Joshua MD; Jun 16 2017  3:01PM EST                       (Author)

## 2018-01-12 ENCOUNTER — ALLSCRIPTS OFFICE VISIT (OUTPATIENT)
Dept: OTHER | Facility: OTHER | Age: 43
End: 2018-01-12

## 2018-01-12 DIAGNOSIS — D64.9 ANEMIA: ICD-10-CM

## 2018-01-12 DIAGNOSIS — R35.0 FREQUENCY OF MICTURITION: ICD-10-CM

## 2018-01-12 DIAGNOSIS — D50.9 IRON DEFICIENCY ANEMIA: ICD-10-CM

## 2018-01-12 NOTE — MISCELLANEOUS
Message   Recorded as Task   Date: 10/30/2017 05:07 PM, Created By: [facility]   Task Name: Follow Up   Assigned To: Estella Jonas   Regarding Patient: Diamante Jaime, Status: In Progress   Comment:    Lisa Teixeira - 30 Oct 2017 5:07 PM     TASK CREATED  Please notify patient ultrasound showed large fibroid but otherwise normal  She should keep her appointment with Dr Tracy Arnold for fibroid consult in [facility] - 30 Oct 2017 5:14 PM     TASK IN [facility] [address],[facility]  [address] - 30 Oct 2017 5:15 PM     TASK EDITED  lmtcb   Alexa Andujar - 31 Oct 2017 1:15 PM     TASK EDITED  [facility] for pt to call back for pelvic results       ext: [phone]   Rico Radha - 31 Oct 2017 1:32 PM     TASK EDITED  Patient left a voice mail for us, I returned her call again and got voice mail  Alexa Andujar - 31 Oct 2017 1:37 PM     TASK EDITED  Patient returned call - she is aware of u/s results and she keeping the appointment  she has with KTM in December  Active Problems    1  Anemia (285 9) (D64 9)   2  Fibroids (218 9) (D25 9)   3  Hypercholesteremia (272 0) (E78 00)   4  Lesion of lip (528 5) (K13 0)   5  Pelvic pain in female (625 9) (R10 2)   6  Pre-diabetes (790 29) (R73 03)   7  Pulsatile tinnitus of right ear (388 30) (H93 A1)   8  Urinary frequency (788 41) (R35 0)    Current Meds   1  Flonase SUSP (Fluticasone Propionate); Therapy: (Recorded:00Lzv2217) to Recorded   2  Multi-Vitamin TABS; Therapy: (Recorded:90Lph5201) to Recorded    Allergies    1   No Known Drug Allergies    Signatures   Electronically signed by : Masoud Wise, ; Oct 31 2017  1:37PM EST                       (Author)

## 2018-01-13 VITALS
HEIGHT: 63 IN | SYSTOLIC BLOOD PRESSURE: 118 MMHG | DIASTOLIC BLOOD PRESSURE: 80 MMHG | BODY MASS INDEX: 24.27 KG/M2 | WEIGHT: 137 LBS

## 2018-01-13 NOTE — PROGRESS NOTES
Assessment   1  Fibroids (218 9) (D25 9)  2  Iron deficiency anemia (280 9) (D50 9)  3  Hypercholesteremia (272 0) (E78 00)    Plan   Anemia    · (1) CBC/PLT/DIFF; Status:Active; Requested VIF:15FGS7611;    · (1) OCCULT BLOOD,FECES(SCREEN); Status:Active; Requested TSW:52LUK1810;    · (1) URINALYSIS w URINE C/S REFLEX (will reflex a microscopy if leukocytes, occult    blood, or nitrites are not within normal limits); Status:Active; Requested GFJ:17EHH5797;   Iron deficiency anemia    · (1) IRON PANEL; Status:Active; Requested ZNI:61XAF2032; Discussion/Summary      Anemia- Recheck CBC, recheck iron levels, check for occult blood and urine  Discussed dietary recommendations to increase iron through food consider re-starting supplement of iron if low levels  Patient is following up with OB/Gyn for this  Recheck in 3 months  up in 3 months  Possible side effects of new medications were reviewed with the patient/guardian today  The treatment plan was reviewed with the patient/guardian  The patient/guardian understands and agrees with the treatment plan      Chief Complaint   Patient seen in office today for a one month since start of taking Iron tablets - she stated that she was having bad stomach cramps she cut back to only once a day  Patient would like to get labs done again to see if she can stop it or take only as needed due to stomach cramping  History of Present Illness   Patient is here to follow-up with her anemia  She was having some GI side effects and stopped taking her iron pills  She just started taking her iron supplement again this week  She made some dietary changes in hopes of being able to stop taking the iron, and she would like her iron rechecked to see if the dietary changes are effective  has concerns about having elevated cholesterol related changes in her diet  states she has uterine fibroids and has a history of relatively heavy periods   was told that she needed her uterine fibroids to be removed however she decided not to do so  Review of Systems        Constitutional: No fever, no chills, feels well, no tiredness, no recent weight gain or weight loss  Eyes: No complaints of eye pain, no red eyes, no eyesight problems, no discharge, no dry eyes, no itching of eyes  ENT: no complaints of earache, no loss of hearing, no nose bleeds, no nasal discharge, no sore throat, no hoarseness  Cardiovascular: No complaints of slow heart rate, no fast heart rate, no chest pain, no palpitations, no leg claudication, no lower extremity edema  Respiratory: No complaints of shortness of breath, no wheezing, no cough, no SOB on exertion, no orthopnea, no PND  Gastrointestinal: No complaints of abdominal pain, no constipation, no nausea or vomiting, no diarrhea, no bloody stools  Genitourinary: No complaints of dysuria, no incontinence, no pelvic pain, no dysmenorrhea, no vaginal discharge or bleeding  Musculoskeletal: No complaints of arthralgias, no myalgias, no joint swelling or stiffness, no limb pain or swelling  Integumentary: No complaints of skin rash or lesions, no itching, no skin wounds, no breast pain or lump  Neurological: No complaints of headache, no confusion, no convulsions, no numbness, no dizziness or fainting, no tingling, no limb weakness, no difficulty walking  Psychiatric: Not suicidal, no sleep disturbance, no anxiety or depression, no change in personality, no emotional problems  Endocrine: No complaints of proptosis, no hot flashes, no muscle weakness, no deepening of the voice, no feelings of weakness  Hematologic/Lymphatic: No complaints of swollen glands, no swollen glands in the neck, does not bleed easily, does not bruise easily  ROS reviewed  Active Problems   1  Anemia (285 9) (D64 9)  2  Fibroids (218 9) (D25 9)  3  Hypercholesteremia (272 0) (E78 00)  4  Iron deficiency anemia (280 9) (D50 9)  5   Lesion of lip (528 5) (K13 0)  6  Menorrhagia with regular cycle (626 2) (N92 0)  7  Pelvic pain in female (625 9) (R10 2)  8  Pre-diabetes (790 29) (R73 03)  9  Pulsatile tinnitus of right ear (388 30) (H93 A1)  10  Urinary frequency (788 41) (R35 0)    Past Medical History   1  History of sickle cell trait (V12 3) (Z86 2)     The active problems and past medical history were reviewed and updated today  Surgical History   1  History of Hernia Repair    Family History   Mother   1  Family history of thyroid disease (V18 19) (Z83 49)  Father   2  Family history of diabetes mellitus (V18 0) (Z83 3)  3  Family history of hypertension (V17 49) (Z82 49)  Son   4  Family history of asthma (V17 5) (Z82 5)  5  Family history of eczema (V19 4) (Z84 0)    Social History    · Never a smoker   · Not currently sexually active   · Social alcohol use (Z78 9)    Current Meds   1  Ferrous Sulfate 325 (65 Fe) MG Oral Tablet; TAKE 1 TABLET 3 TIMES DAILY WITH FOOD; Therapy: 54XFG2527 to (Evaluate:06Jan2018)  Requested for: 28DXV1782; Last     Rx:07Nov2017 Ordered  2  Multi-Vitamin TABS; Therapy: (Recorded:25Qih9365) to Recorded    Allergies   1  No Known Drug Allergies    Vitals   Vital Signs    Recorded: 12Jan2018 11:05AM   Heart Rate 79   Systolic 540   Diastolic 70   Height 5 ft 3 in   Weight 139 lb    BMI Calculated 24 62   BSA Calculated 1 66   O2 Saturation 99     Physical Exam        Constitutional      General appearance: No acute distress, well appearing and well nourished  Eyes      Conjunctiva and lids: No swelling, erythema or discharge  Ears, Nose, Mouth, and Throat      External inspection of ears and nose: Normal        Pulmonary      Respiratory effort: No increased work of breathing or signs of respiratory distress  Cardiovascular      Palpation of heart: Normal PMI, no thrills         Musculoskeletal      Gait and station: Normal        Psychiatric      Orientation to person, place, and time: Normal        Mood and affect: Normal           Signatures    Electronically signed by : Larry Orozco MD; Jan 12 2018 11:45AM EST                       (Author)     Electronically signed by : Larry Orozco MD; Jan 12 2018 11:45AM EST                       (Author)

## 2018-01-14 VITALS
SYSTOLIC BLOOD PRESSURE: 118 MMHG | HEIGHT: 64 IN | OXYGEN SATURATION: 97 % | TEMPERATURE: 97.4 F | DIASTOLIC BLOOD PRESSURE: 70 MMHG | WEIGHT: 138.38 LBS | HEART RATE: 90 BPM | BODY MASS INDEX: 23.63 KG/M2

## 2018-01-15 VITALS
OXYGEN SATURATION: 100 % | WEIGHT: 137 LBS | TEMPERATURE: 97.8 F | HEART RATE: 91 BPM | SYSTOLIC BLOOD PRESSURE: 110 MMHG | DIASTOLIC BLOOD PRESSURE: 78 MMHG | BODY MASS INDEX: 24.27 KG/M2 | HEIGHT: 63 IN

## 2018-01-16 ENCOUNTER — GENERIC CONVERSION - ENCOUNTER (OUTPATIENT)
Dept: OTHER | Facility: OTHER | Age: 43
End: 2018-01-16

## 2018-01-17 ENCOUNTER — TRANSCRIBE ORDERS (OUTPATIENT)
Dept: ADMINISTRATIVE | Facility: HOSPITAL | Age: 43
End: 2018-01-17

## 2018-01-17 ENCOUNTER — APPOINTMENT (OUTPATIENT)
Dept: LAB | Facility: HOSPITAL | Age: 43
End: 2018-01-17
Attending: OBSTETRICS & GYNECOLOGY
Payer: COMMERCIAL

## 2018-01-17 DIAGNOSIS — R35.0 FREQUENCY OF MICTURITION: ICD-10-CM

## 2018-01-17 LAB
BACTERIA UR QL AUTO: ABNORMAL /HPF
BILIRUB UR QL STRIP: NEGATIVE
CLARITY UR: ABNORMAL
COLOR UR: YELLOW
GLUCOSE UR STRIP-MCNC: NEGATIVE MG/DL
HGB UR QL STRIP.AUTO: ABNORMAL
KETONES UR STRIP-MCNC: NEGATIVE MG/DL
LEUKOCYTE ESTERASE UR QL STRIP: ABNORMAL
NITRITE UR QL STRIP: NEGATIVE
NON-SQ EPI CELLS URNS QL MICRO: ABNORMAL /HPF
PH UR STRIP.AUTO: 6 [PH] (ref 4.5–8)
PROT UR STRIP-MCNC: ABNORMAL MG/DL
RBC #/AREA URNS AUTO: ABNORMAL /HPF
SP GR UR STRIP.AUTO: 1.02 (ref 1–1.03)
UROBILINOGEN UR QL STRIP.AUTO: 0.2 E.U./DL
WBC #/AREA URNS AUTO: ABNORMAL /HPF

## 2018-01-17 PROCEDURE — 81001 URINALYSIS AUTO W/SCOPE: CPT

## 2018-01-17 PROCEDURE — 87186 SC STD MICRODIL/AGAR DIL: CPT

## 2018-01-17 PROCEDURE — 87077 CULTURE AEROBIC IDENTIFY: CPT

## 2018-01-17 PROCEDURE — 87086 URINE CULTURE/COLONY COUNT: CPT

## 2018-01-17 NOTE — MISCELLANEOUS
Message   Recorded as Task   Date: 10/30/2017 08:12 AM, Created By: Michael Potts   Task Name: Follow Up   Assigned To: Alex Cardenas   Regarding Patient: Philipp Garcia, Status: In Progress   Comment:    Lisa Teixeira - 30 Oct 2017 8:12 AM     TASK CREATED  Please notify urine culture was normal   Alexa Andujar - 30 Oct 2017 9:02 AM     TASK IN PROGRESS   Alexa Andujar - 30 Oct 2017 9:04 AM     TASK EDITED  Patient is aware of culture results  Active Problems    1  Anemia (285 9) (D64 9)   2  Fibroids (218 9) (D25 9)   3  Hypercholesteremia (272 0) (E78 00)   4  Lesion of lip (528 5) (K13 0)   5  Pelvic pain in female (625 9) (R10 2)   6  Pre-diabetes (790 29) (R73 03)   7  Pulsatile tinnitus of right ear (388 30) (H93 A1)   8  Urinary frequency (788 41) (R35 0)    Current Meds   1  Flonase SUSP (Fluticasone Propionate); Therapy: (Recorded:32Skx1266) to Recorded   2  Multi-Vitamin TABS; Therapy: (Recorded:93Pdq6847) to Recorded    Allergies    1   No Known Drug Allergies    Signatures   Electronically signed by : Ronnie Thakur, ; Oct 30 2017  9:04AM EST                       (Author)

## 2018-01-18 ENCOUNTER — LAB REQUISITION (OUTPATIENT)
Dept: LAB | Facility: HOSPITAL | Age: 43
End: 2018-01-18
Payer: COMMERCIAL

## 2018-01-18 ENCOUNTER — GENERIC CONVERSION - ENCOUNTER (OUTPATIENT)
Dept: OTHER | Facility: OTHER | Age: 43
End: 2018-01-18

## 2018-01-18 ENCOUNTER — ALLSCRIPTS OFFICE VISIT (OUTPATIENT)
Dept: OTHER | Facility: OTHER | Age: 43
End: 2018-01-18

## 2018-01-18 ENCOUNTER — TRANSCRIBE ORDERS (OUTPATIENT)
Dept: ADMINISTRATIVE | Facility: HOSPITAL | Age: 43
End: 2018-01-18

## 2018-01-18 DIAGNOSIS — D64.9 ANEMIA, UNSPECIFIED TYPE: Primary | ICD-10-CM

## 2018-01-18 DIAGNOSIS — D25.9 UTERINE LEIOMYOMA, UNSPECIFIED LOCATION: ICD-10-CM

## 2018-01-18 DIAGNOSIS — N92.0 MENORRHAGIA WITH REGULAR CYCLE: ICD-10-CM

## 2018-01-18 DIAGNOSIS — R10.2 PELVIC AND PERINEAL PAIN: ICD-10-CM

## 2018-01-18 PROCEDURE — 87591 N.GONORRHOEAE DNA AMP PROB: CPT | Performed by: NURSE PRACTITIONER

## 2018-01-18 PROCEDURE — 87186 SC STD MICRODIL/AGAR DIL: CPT | Performed by: NURSE PRACTITIONER

## 2018-01-18 PROCEDURE — 87491 CHLMYD TRACH DNA AMP PROBE: CPT | Performed by: NURSE PRACTITIONER

## 2018-01-18 PROCEDURE — 87077 CULTURE AEROBIC IDENTIFY: CPT | Performed by: NURSE PRACTITIONER

## 2018-01-18 PROCEDURE — 87070 CULTURE OTHR SPECIMN AEROBIC: CPT | Performed by: NURSE PRACTITIONER

## 2018-01-19 ENCOUNTER — GENERIC CONVERSION - ENCOUNTER (OUTPATIENT)
Dept: OTHER | Facility: OTHER | Age: 43
End: 2018-01-19

## 2018-01-19 LAB
BACTERIA UR CULT: ABNORMAL
BACTERIA UR CULT: ABNORMAL

## 2018-01-21 LAB
BACTERIA GENITAL AEROBE CULT: ABNORMAL
BACTERIA GENITAL AEROBE CULT: ABNORMAL

## 2018-01-22 ENCOUNTER — GENERIC CONVERSION - ENCOUNTER (OUTPATIENT)
Dept: OTHER | Facility: OTHER | Age: 43
End: 2018-01-22

## 2018-01-22 ENCOUNTER — HOSPITAL ENCOUNTER (OUTPATIENT)
Dept: MRI IMAGING | Facility: HOSPITAL | Age: 43
Discharge: HOME | End: 2018-01-22
Attending: OBSTETRICS & GYNECOLOGY
Payer: COMMERCIAL

## 2018-01-22 DIAGNOSIS — D64.9 ANEMIA, UNSPECIFIED TYPE: ICD-10-CM

## 2018-01-22 DIAGNOSIS — N92.0 MENORRHAGIA WITH REGULAR CYCLE: ICD-10-CM

## 2018-01-22 DIAGNOSIS — D25.9 UTERINE LEIOMYOMA, UNSPECIFIED LOCATION: ICD-10-CM

## 2018-01-22 LAB
CHLAMYDIA DNA CVX QL NAA+PROBE: NORMAL
N GONORRHOEA DNA GENITAL QL NAA+PROBE: NORMAL

## 2018-01-22 PROCEDURE — A9585 GADOBUTROL INJECTION: HCPCS | Performed by: OBSTETRICS & GYNECOLOGY

## 2018-01-22 PROCEDURE — 72197 MRI PELVIS W/O & W/DYE: CPT

## 2018-01-22 RX ADMIN — GADOBUTROL 6 ML: 604.72 INJECTION INTRAVENOUS at 08:07

## 2018-01-23 VITALS
SYSTOLIC BLOOD PRESSURE: 118 MMHG | WEIGHT: 139 LBS | DIASTOLIC BLOOD PRESSURE: 74 MMHG | BODY MASS INDEX: 24.63 KG/M2 | HEIGHT: 63 IN

## 2018-01-23 VITALS
DIASTOLIC BLOOD PRESSURE: 70 MMHG | BODY MASS INDEX: 24.63 KG/M2 | WEIGHT: 139 LBS | SYSTOLIC BLOOD PRESSURE: 104 MMHG | HEIGHT: 63 IN | OXYGEN SATURATION: 99 % | HEART RATE: 79 BPM

## 2018-01-23 NOTE — MISCELLANEOUS
Message   Recorded as Task   Date: 12/11/2017 01:16 PM, Created By: Alberta Cheung   Task Name: Go to Result   Assigned To: Emilio Stone   Regarding Patient: Armida Da Silva, Status: Active   CommentWaldo Millan - 11 Dec 2017 1:16 PM     TASK CREATED  aroldo sellers - biopsy is normal    Nallely Graye - 12 Dec 2017 9:52 AM     TASK EDITED  pt informed of bx result        Active Problems    1  Anemia (285 9) (D64 9)   2  Fibroids (218 9) (D25 9)   3  Hypercholesteremia (272 0) (E78 00)   4  Iron deficiency anemia (280 9) (D50 9)   5  Lesion of lip (528 5) (K13 0)   6  Menorrhagia with regular cycle (626 2) (N92 0)   7  Pelvic pain in female (625 9) (R10 2)   8  Pre-diabetes (790 29) (R73 03)   9  Pulsatile tinnitus of right ear (388 30) (H93 A1)   10  Urinary frequency (788 41) (R35 0)    Current Meds   1  Ferrous Sulfate 325 (65 Fe) MG Oral Tablet; TAKE 1 TABLET 3 TIMES DAILY WITH   FOOD; Therapy: 78JTW5519 to (Evaluate:06Jan2018)  Requested for: 91VKA9560; Last   Rx:07Nov2017 Ordered   2  Multi-Vitamin TABS; Therapy: (Recorded:34Uvg7936) to Recorded    Allergies    1   No Known Drug Allergies    Signatures   Electronically signed by : Yamilet Arciniega, ; Dec 12 2017  9:52AM EST                       (Author)

## 2018-01-23 NOTE — RESULT NOTES
Message  Undergoing treatment for Endometritis  UCX +   Will add Bactrim to regimen as per susceptibility findings      Verified Results  (1) URINE CULTURE 60OWC3351 01:01PM Miranda Covarrubias Order Number: [id number]     Test Name Result Flag Reference   CLINICAL REPORT (Report) A    Test:        Urine culture  Specimen Type:   Urine  Specimen Date:   1/17/2018 1:01 PM  Result Date:    1/19/2018 12:44 PM  Result Status:   Final result  Abnormal:      Yes  Resulting Lab:   [facility] [address] [place] [id number]            Tel: [phone]      CULTURE                                       ------------------                                   >100,000 cfu/ml Proteus mirabilis (Abnormal)    30,000-39,000 cfu/ml      *** Mixed Contaminants X3 ***      SUSCEPTIBILITY                                   ------------------                                                       Proteus mirabilis  METHOD                 RAUDEL  -------------------------------------  -------------------------  AMPICILLIN ($$)             >16 00 ug/ml Resistant  AMPICILLIN + SULBACTAM ($)       >16/8 ug/ml  Resistant  AZTREONAM ($$$)             <=8 ug/ml   Susceptible  CEFAZOLIN ($)              >16 00 ug/ml Resistant  CEFOTAXIME ($)             8 00 ug/ml  Susceptible  CEFTAZIDIME ($$)            8 ug/ml    Susceptible  CEFTRIAXONE ($$)            <=8 00 ug/ml Susceptible  CEFUROXIME ($$)             >16 ug/ml   Resistant  CIPROFLOXACIN ($)            <=1 00 ug/ml Susceptible  GENTAMICIN ($$)             <=4 ug/ml   Susceptible  LEVOFLOXACIN ($)            <=2 00 ug/ml Susceptible  NITROFURANTOIN             >64 ug/ml   Resistant  PIPERACILLIN + TAZOBACTAM ($$$)     <=16 ug/ml  Susceptible  TETRACYCLINE              >8 ug/ml   Resistant  TOBRAMYCIN ($)             <=4 ug/ml   Susceptible  TRIMETHOPRIM + SULFAMETHOXAZOLE ($$$)  <=2/38 ug/ml Susceptible       Signatures Electronically signed by : Billy Garcia MD; Jan 19 2018  1:16PM EST                       (Author)

## 2018-01-23 NOTE — MISCELLANEOUS
Message   Recorded as Task   Date: 01/16/2018 01:12 PM, Created By: Uli Hernández   Task Name: Medical Complaint Callback   Assigned To: Salomon Torrez   Regarding Patient: Carlin Quinn, Status: In Progress   Comment:    Mary Chadwick - 16 Jan 2018 1:12 PM     TASK CREATED  Caller: Self; Medical Complaint; [phone] (Home)  Incoming call from patient  Reports new symptoms this past week including lower back pain ,abdominal pain, dysuria and visible hematuria  Afebrile  Has not gone for MRI as of yet for fibroids  Does not know if this is a UTI as she has never had one before  Asking if this could be related to fibroid  Will route to provider to advise further  Laquita Kimball - 16 Jan 2018 1:26 PM     TASK REPLIED TO: Previously Assigned To Karli Perez  please treat with bactrim DS bid x 7  and order urinalysis with culture  Increase hydration    only related to fibroids if she is on her menses  could be kidney stone, uti/pyelonephritis --if nausea/vomitting, severe pain may need to go to ER  Uli Hernández - 16 Jan 2018 3:25 PM     TASK EDITED  Left message for patient to call back for instructions   Alexa Andujar - 16 Jan 2018 3:27 PM     TASK IN PROGRESS   Alexa Andujar - 16 Jan 2018 3:30 PM     TASK EDITED  Rx sent to pharmacy and orders available in [facility]  Shelia Gray - 16 Jan 2018 5:39 PM     TASK EDITED  spoke with pt, she will [facility] and then start rx        Active Problems    1  Anemia (285 9) (D64 9)   2  Fibroids (218 9) (D25 9)   3  Hypercholesteremia (272 0) (E78 00)   4  Iron deficiency anemia (280 9) (D50 9)   5  Lesion of lip (528 5) (K13 0)   6  Menorrhagia with regular cycle (626 2) (N92 0)   7  Pelvic pain in female (625 9) (R10 2)   8  Pre-diabetes (790 29) (R73 03)   9  Pulsatile tinnitus of right ear (388 30) (H93 A1)   10  Urinary frequency (788 41) (R35 0)    Current Meds   1   Ferrous Sulfate 325 (65 Fe) MG Oral Tablet; TAKE 1 TABLET 3 TIMES DAILY WITH   FOOD; Therapy: 70ULC9828 to (Evaluate:2018)  Requested for: 21FWO4795; Last   Rx:2017 Ordered   2  Multi-Vitamin TABS; Therapy: (Recorded:08Wmn9333) to Recorded   3  Sulfamethoxazole-Trimethoprim 800-160 MG Oral Tablet (Bactrim DS); TAKE 1 TABLET   TWICE DAILY WITH FOOD; Therapy: 43DYQ5522 to (NLHQMGEX:98UUB8137)  Requested for: 82MIS2983; Last   Rx:2018 Ordered    Allergies    1  No Known Drug Allergies    Plan  Urinary frequency    · (1) URINALYSIS WITH MICROSCOPIC; Status:Active - Retrospective Authorization; Requested QJS:25MRZ3718;    · (1) URINE CULTURE; Source:Urine, Clean Catch; Status:Active - Retrospective  Authorization;  Requested [id number];     Signatures   Electronically signed by : Brianna Blanca, ; 2018  5:40PM EST                       (Author)

## 2018-01-23 NOTE — MISCELLANEOUS
Message   Recorded as Task   Date: 01/19/2018 01:14 PM, Created By: Ollie Saldana   Task Name: Go to Result   Assigned To: Laurie Morton   Regarding Patient: Osmany Osullivan, Status: In Progress   Comment:    MelanieLaquita Richardson - 19 Jan 2018 1:14 PM     TASK CREATED  please call Alexy Bowie- her urine did come back positive for infection  I need to add an additional antibiotic to her regimen for treatment  please send in keflex 500 bid x 7 for treatment of UTI   ElidaMiguelLaquita - 19 Jan 2018 1:17 PM     TASK REASSIGNED: Previously Assigned To [address] [place] [address] that  Please send in Bactrim DS BId x 7   Shelia Gray - 19 Jan 2018 1:20 PM     TASK IN PROGRESS   Shelia Gray - 19 Jan 2018 1:26 PM     TASK REASSIGNED: Previously Assigned To Murali Rolilns - 19 Jan 2018 1:39 PM     TASK EDITED  spoke with Johnathon Allen as pt is currently on metronidazole   ok to  also take the Bactrim    spoke with pt    encouraged to take with food, force fluids and vary medication times        Active Problems    1  Abnormal vaginal bleeding (623 8) (N93 9)   2  Anemia (285 9) (D64 9)   3  Endometritis (615 9) (N71 9)   4  Fibroids (218 9) (D25 9)   5  Hypercholesteremia (272 0) (E78 00)   6  Iron deficiency anemia (280 9) (D50 9)   7  Lesion of lip (528 5) (K13 0)   8  Menorrhagia with regular cycle (626 2) (N92 0)   9  Pelvic pain (R10 2)   10  Pelvic pain in female (625 9) (R10 2)   11  Pre-diabetes (790 29) (R73 03)   12  Pulsatile tinnitus of right ear (388 30) (H93 A1)   13  Urinary frequency (788 41) (R35 0)    Current Meds   1  Doxycycline Hyclate 100 MG Oral Tablet; ONE TABLET BY MOUTH TWO TIMES DAILY; Therapy: 30DKI9841 to 97 449025)  Requested for: 36FCY7117; Last   Rx:18Jan2018 Ordered   2  Ferrous Sulfate 325 (65 Fe) MG Oral Tablet; TAKE 1 TABLET 3 TIMES DAILY WITH   FOOD;    Therapy: 16WNQ5520 to (Evaluate:06Jan2018)  Requested for: 63GUQ9595; Last   QS:40MXK9274 Ordered 3  MetroNIDAZOLE 500 MG Oral Tablet; Take 1 tablet twice daily; Therapy: 60DOZ2646 to 97 909460)  Requested for: 49FZQ5675; Last   Rx:18Jan2018 Ordered   4  Multi-Vitamin TABS; Therapy: (Recorded:81Uzi3092) to Recorded   5  Sulfamethoxazole-Trimethoprim 800-160 MG Oral Tablet (Bactrim DS); TAKE 1 TABLET   TWICE DAILY WITH FOOD; Therapy: 35ZFZ9276 to (LEYUXQGM:10FWK9341)  Requested for: 65PFO2833; Last   Rx:16Jan2018 Ordered    Allergies    1   No Known Drug Allergies    Signatures   Electronically signed by : Santino Miller, ; Jan 19 2018  1:39PM EST                       (Author)

## 2018-01-23 NOTE — MISCELLANEOUS
Message   Recorded as Task   Date: 01/19/2018 09:15 AM, Created By: Akin Levi   Task Name: Care Coordination   Assigned To: Ahmet Gaytan   Regarding Patient: David Pascual, Status: In Progress   Nella Tavarez - 19 Jan 2018 9:15 AM     TASK CREATED  I saw this patient yesterday for pelvic pain and abn bleeding - diagnosis is endometritis  She started antibiotics yesterday - please contact her to check on her status  If she is feeling worse please let me know - she may need to be evaluated in the ED  She will need to schedule follow up with us early next week - please offer her an appt with me on Monday or Tuesday  She is scheduled for MRI on Monday - plan was uterine artery embolization  She can have the MRI but we need to confirm that she is not scheduled with interventional radiology for [facility] until after the endometritis is fully resolved  Thanks   Shelia Gray - 19 Jan 2018 9:15 AM     TASK IN PROGRESS   Shelia Gray - 19 Jan 2018 9:19 AM     TASK EDITED  spoke with pt, states bleeding is the same    had severe pain this am, but was able to fall asleep and pain is minimal now  she awaits further instructions  to nilesh        Active Problems    1  Abnormal vaginal bleeding (623 8) (N93 9)   2  Anemia (285 9) (D64 9)   3  Endometritis (615 9) (N71 9)   4  Fibroids (218 9) (D25 9)   5  Hypercholesteremia (272 0) (E78 00)   6  Iron deficiency anemia (280 9) (D50 9)   7  Lesion of lip (528 5) (K13 0)   8  Menorrhagia with regular cycle (626 2) (N92 0)   9  Pelvic pain (R10 2)   10  Pelvic pain in female (625 9) (R10 2)   11  Pre-diabetes (790 29) (R73 03)   12  Pulsatile tinnitus of right ear (388 30) (H93 A1)   13  Urinary frequency (788 41) (R35 0)    Current Meds   1  Doxycycline Hyclate 100 MG Oral Tablet; ONE TABLET BY MOUTH TWO TIMES DAILY; Therapy: 35ZAS5735 to [phone])  Requested for: 65AGK9845; Last   Rx:18Jan2018 Ordered   2   Ferrous Sulfate 325 (65 Fe) MG Oral Tablet; TAKE 1 TABLET 3 TIMES DAILY WITH   FOOD; Therapy: 92DKF6909 to (Evaluate:06Jan2018)  Requested for: 74TYB1439; Last   Rx:07Nov2017 Ordered   3  MetroNIDAZOLE 500 MG Oral Tablet; Take 1 tablet twice daily; Therapy: 93FMA7667 to [phone])  Requested for: 02TXM3740; Last   Rx:18Jan2018 Ordered   4  Multi-Vitamin TABS; Therapy: (Recorded:90Tpv4500) to Recorded   5  Sulfamethoxazole-Trimethoprim 800-160 MG Oral Tablet (Bactrim DS); TAKE 1 TABLET   TWICE DAILY WITH FOOD; Therapy: 26YNG2026 to (MITYVNXR:62MNB3885)  Requested for: 99LSJ2731; Last   Rx:16Jan2018 Ordered    Allergies    1   No Known Drug Allergies    Signatures   Electronically signed by : Yamilet Arciniega, ; Jan 19 2018  9:20AM EST                       (Author)

## 2018-01-23 NOTE — MISCELLANEOUS
Message  Please excuse Radha Diaz from work on 1/18-1/19   Return to work or school:   Trisha Knight is under my professional care   She was seen in my office on 1/18/2018             Signatures  Jax Butler NP    Electronically signed by : Christina Dai; Jan 19 2018 10:18AM EST                       (Author)    Electronically signed by : Christina Dai; Jan 19 2018 10:18AM EST                       (Author)

## 2018-01-23 NOTE — MISCELLANEOUS
Message   Recorded as Task   Date: 01/18/2018 09:48 AM, Created By: Jeanne Piña   Task Name: Call Back   Assigned To: Dayan Christianson   Regarding Patient: Jackye Castleman, Status: In Progress   Comment:    Bere Barrientos - 18 Jan 2018 9:48 AM     TASK CREATED  Pt called office today, left voicemail message  Pt saw Dr Maria C Perera on 12/4/17  Pt was prescribed Bactrim on 1/16/18 by KTM for urinary frequency/UTI symptoms  Pt called to request recent urinary test results  Pt can be reached @ [phone]  Thank you! Bere Barrientos - 18 Jan 2018 9:52 AM     TASK IN PROGRESS   Bere Barrientos - 18 Jan 2018 10:42 AM     TASK EDITED  Spoke with Pt via phone call today  Pt presented being quite emotional/tearful during phone conversation  Pt states that "she has been feeling horrible since the doctor performed a biopsy of her uterus "  Pt further states "she is passing huge bloody clots that have an awful odor "  Pt reports that she is having extreme pelvic pain, rates pelvic pain as an "8" on pain scale  Pt states that she does not know her LMP due to constant vaginal bleeding since December  Pt was previously assessed with fibroids, anemia, and menorrhagia with regular cycle  Pelvic ultrasound was performed on 10/28/17 for fibroid uterus & frequent urination, ultrasound showed 7 cm fundal fibroid  An endometrial biopsy was performed on 12/9/17 with benign result  Pt saw Dr Maria C Perera on 12/4/17  Pt states she does not feel it is necessary to go to the ER at this time  Pt scheduled for an appointment today with Shar Murguia at 3:00 pm [facility] office) to address vaginal bleeding and urinary symptoms  reiterated to Pt that if her symptoms worsen or she has questions/concerns, to contact office  Active Problems    1  Anemia (285 9) (D64 9)   2  Fibroids (218 9) (D25 9)   3  Hypercholesteremia (272 0) (E78 00)   4  Iron deficiency anemia (280 9) (D50 9)   5   Lesion of lip (528 5) (K13 0)   6  Menorrhagia with regular cycle (626 2) (N92 0)   7  Pelvic pain in female (625 9) (R10 2)   8  Pre-diabetes (790 29) (R73 03)   9  Pulsatile tinnitus of right ear (388 30) (H93 A1)   10  Urinary frequency (788 41) (R35 0)    Current Meds   1  Ferrous Sulfate 325 (65 Fe) MG Oral Tablet; TAKE 1 TABLET 3 TIMES DAILY WITH   FOOD; Therapy: 57BGH5944 to (Evaluate:06Jan2018)  Requested for: 29KUJ2684; Last   Rx:07Nov2017 Ordered   2  Multi-Vitamin TABS; Therapy: (Recorded:33Rmw9759) to Recorded   3  Sulfamethoxazole-Trimethoprim 800-160 MG Oral Tablet (Bactrim DS); TAKE 1 TABLET   TWICE DAILY WITH FOOD; Therapy: 07JQZ8441 to (MYUJZYUI:71SLX9781)  Requested for: 19ZQB3281; Last   Rx:16Jan2018 Ordered    Allergies    1   No Known Drug Allergies    Signatures   Electronically signed by : Alexa Olea MA; Jan 18 2018 10:43AM EST                       (Author)

## 2018-01-23 NOTE — MISCELLANEOUS
Message   Recorded as Task   Date: 01/19/2018 09:15 AM, Created By: Sunday Rae   Task Name: Care Coordination   Assigned To: Asim Prince   Regarding Patient: Armida Da Silva, Status: In Progress   Vanessa Butler - 19 Jan 2018 9:15 AM     TASK CREATED  I saw this patient yesterday for pelvic pain and abn bleeding - diagnosis is endometritis  She started antibiotics yesterday - please contact her to check on her status  If she is feeling worse please let me know - she may need to be evaluated in the ED  She will need to schedule follow up with us early next week - please offer her an appt with me on Monday or Tuesday  She is scheduled for MRI on Monday - plan was uterine artery embolization  She can have the MRI but we need to confirm that she is not scheduled with interventional radiology for [facility] until after the endometritis is fully resolved  Thanks   Shelia Gray - 19 Jan 2018 9:15 AM     TASK IN PROGRESS   Shelia Gray - 19 Jan 2018 9:19 AM     TASK EDITED  spoke with pt, states bleeding is the same    had severe pain this am, but was able to fall asleep and pain is minimal now  she awaits further instructions  to Hayder Ray - 19 Jan 2018 9:20 AM     TASK REASSIGNED: Previously Assigned To Juliocesar Champion - 19 Jan 2018 9:53 AM     TASK REPLIED TO: Previously Assigned To Sunday Rae  OK  I would expect she will begin to feel better after 48 hrs of antibiotics  If she develops fever or pain increases significantly she should let us know or report to ED  Please encourage ibuprofen 600mg PO PRN for pain  Provide reassurance that this will not "mask" concerning symptoms - benefits of taking this for pain management outweighs the risk  Shelia Gray - 19 Jan 2018 10:06 AM     TASK EDITED  spoke with pt    relayed Matilda's instructions    she wcb here  today if any significant change    otherwise she will go to er        Active Problems    1  Abnormal vaginal bleeding (623 8) (N93 9)   2  Anemia (285 9) (D64 9)   3  Endometritis (615 9) (N71 9)   4  Fibroids (218 9) (D25 9)   5  Hypercholesteremia (272 0) (E78 00)   6  Iron deficiency anemia (280 9) (D50 9)   7  Lesion of lip (528 5) (K13 0)   8  Menorrhagia with regular cycle (626 2) (N92 0)   9  Pelvic pain (R10 2)   10  Pelvic pain in female (625 9) (R10 2)   11  Pre-diabetes (790 29) (R73 03)   12  Pulsatile tinnitus of right ear (388 30) (H93 A1)   13  Urinary frequency (788 41) (R35 0)    Current Meds   1  Doxycycline Hyclate 100 MG Oral Tablet; ONE TABLET BY MOUTH TWO TIMES DAILY; Therapy: 64NDG8009 to [phone])  Requested for: 27BPT5249; Last   Rx:18Jan2018 Ordered   2  Ferrous Sulfate 325 (65 Fe) MG Oral Tablet; TAKE 1 TABLET 3 TIMES DAILY WITH   FOOD; Therapy: 88RZL6359 to (Evaluate:06Jan2018)  Requested for: 52CDG5297; Last   Rx:07Nov2017 Ordered   3  MetroNIDAZOLE 500 MG Oral Tablet; Take 1 tablet twice daily; Therapy: 88GYO5894 to [phone])  Requested for: 25MJR3573; Last   Rx:18Jan2018 Ordered   4  Multi-Vitamin TABS; Therapy: (Recorded:82Nzv0476) to Recorded   5  Sulfamethoxazole-Trimethoprim 800-160 MG Oral Tablet (Bactrim DS); TAKE 1 TABLET   TWICE DAILY WITH FOOD; Therapy: 06HGE9869 to (GWDMCODA:87ILR8773)  Requested for: 01NWA7342; Last   Rx:16Jan2018 Ordered    Allergies    1   No Known Drug Allergies    Signatures   Electronically signed by : Corrine Washington, ; Jan 19 2018 10:06AM EST                       (Author)

## 2018-01-23 NOTE — MISCELLANEOUS
Message   Recorded as Task   Date: 12/06/2017 12:43 PM, Created By: Kizzy Webb   Task Name: Care Coordination   Assigned To: Miguel Ramsay   Regarding Patient: Bruna Hamilton, Status: In Progress   Comment:    Kizzy Webb - 06 Dec 2017 12:43 PM     TASK CREATED  LM cell phone for pt to call back to schedule procedure   Kizzy Webb - 06 Dec 2017 12:43 PM     TASK IN PROGRESS   Kizzy Webb - 11 Dec 2017 10:39 AM     TASK EDITED  Called pt on her cell phone [phone],  for her to call back to schedule procedure   Kizzy Webb - 12 Dec 2017 12:29 PM     TASK REASSIGNED: Previously Assigned To Kizzy Webb  Called and talked to pt  She would like you to call her she now thinks that she wants to have the Uterine Artery Embolization done  not the Novasure  Pt does not want to schedule with out talking to you about possible surgery being changed Pt is aware that if procedure is changed that a new consent form needs to be signed   Valeriano Harding - 19 Dec 2017 9:19 AM     TASK REASSIGNED: Previously Assigned To [employer] patient, got voicemail  if she would like [facility]- need to order an MRI pelvis and schedule consult with interventional radiology  Alexa Andujar - 19 Dec 2017 1:54 PM     TASK EDITED  [facility] for pt to cb  Orders are available in the chart, also mailed it to the pt     ext: Eli Christofer - 20 Dec 2017 10:40 AM     TASK EDITED  per Anais Landry, no auth required for mri   Alexa Andujar - 20 Dec 2017 10:43 AM     TASK EDITED  Patient returned call - she is aware that orders have been mailed to her and that they are available in [employer]  Azucena Garcia is calling the insurance to see if a pre-cert is needed  Patient would like to have KTM call her to discuss the procedure and to see if this is the best route to go  Best reached @ [phone]     Valeriano Harding - 20 Dec 2017 12:53 PM     TASK REPLIED TO: Previously Assigned To Valeriano Harding I spoke with Amanda Belcher - would like to proceed with [facility]  Please follow up on authorization so patient can call to schedule appts for MRI and Interventional radiology appt  Alexa Andujar - 20 Dec 2017 12:55 PM     TASK IN PROGRESS   Alexa Andujar - 20 Dec 2017 12:57 PM     TASK EDITED  [facility] for pt to cb about not needing a pre-cert for the MRI  Alexa Andujar - 21 Dec 2017 1:09 PM     TASK EDITED  Called pt at work - she is on break, will cb in 30 mins  Alexa Andujar - 21 Dec 2017 1:39 PM     TASK EDITED  Patient is aware that she can do an MRI and does not need a pre-cert for it  She will contact central scheduling to make an appointment  Active Problems    1  Anemia (285 9) (D64 9)   2  Fibroids (218 9) (D25 9)   3  Hypercholesteremia (272 0) (E78 00)   4  Iron deficiency anemia (280 9) (D50 9)   5  Lesion of lip (528 5) (K13 0)   6  Menorrhagia with regular cycle (626 2) (N92 0)   7  Pelvic pain in female (625 9) (R10 2)   8  Pre-diabetes (790 29) (R73 03)   9  Pulsatile tinnitus of right ear (388 30) (H93 A1)   10  Urinary frequency (788 41) (R35 0)    Current Meds   1  Ferrous Sulfate 325 (65 Fe) MG Oral Tablet; TAKE 1 TABLET 3 TIMES DAILY WITH   FOOD; Therapy: 47MYS6070 to (Evaluate:06Jan2018)  Requested for: 65ZMD9988; Last   Rx:07Nov2017 Ordered   2  Multi-Vitamin TABS; Therapy: (Recorded:76Zih6105) to Recorded    Allergies    1   No Known Drug Allergies    Signatures   Electronically signed by : Niki Kincaid, ; Dec 21 2017  1:40PM EST                       (Author)

## 2018-01-24 VITALS
WEIGHT: 140.38 LBS | BODY MASS INDEX: 24.88 KG/M2 | HEIGHT: 63 IN | SYSTOLIC BLOOD PRESSURE: 122 MMHG | DIASTOLIC BLOOD PRESSURE: 70 MMHG

## 2018-01-24 NOTE — MISCELLANEOUS
Message   Recorded as Task   Date: 01/22/2018 07:44 AM, Created By: Heather Hernandez   Task Name: Care Coordination   Assigned To: Clarice Yee   Regarding Patient: Luis Manuel Ramirez, Status: In Progress   Clay Villarreal - 22 Jan 2018 7:44 AM     TASK CREATED  This patient was encouraged to schedule follow up on endometritis early this week but I don't see an appointment scheduled  Could we please reach out to her today to get her on my schedule? Thanks   Alexa Andujar - 22 Jan 2018 8:25 AM     TASK IN PROGRESS   Aelxa Andujar - 22 Jan 2018 8:35 AM     TASK EDITED  Spoke with pt - made an appointment for her to come in on Friday to the [place] office at 10:20am  That is the only time the pt can come in  Active Problems    1  Abnormal vaginal bleeding (623 8) (N93 9)   2  Anemia (285 9) (D64 9)   3  Endometritis (615 9) (N71 9)   4  Fibroids (218 9) (D25 9)   5  Hypercholesteremia (272 0) (E78 00)   6  Iron deficiency anemia (280 9) (D50 9)   7  Lesion of lip (528 5) (K13 0)   8  Menorrhagia with regular cycle (626 2) (N92 0)   9  Pelvic pain (R10 2)   10  Pelvic pain in female (625 9) (R10 2)   11  Pre-diabetes (790 29) (R73 03)   12  Pulsatile tinnitus of right ear (388 30) (H93 A1)   13  Urinary frequency (788 41) (R35 0)    Current Meds   1  Doxycycline Hyclate 100 MG Oral Tablet; ONE TABLET BY MOUTH TWO TIMES DAILY; Therapy: 02LPF5311 to [phone])  Requested for: 69GGT5154; Last   Rx:18Jan2018 Ordered   2  Ferrous Sulfate 325 (65 Fe) MG Oral Tablet; TAKE 1 TABLET 3 TIMES DAILY WITH   FOOD; Therapy: 79RGX0560 to (Evaluate:06Jan2018)  Requested for: 71CUY6165; Last   Rx:07Nov2017 Ordered   3  MetroNIDAZOLE 500 MG Oral Tablet; Take 1 tablet twice daily; Therapy: 80TNH6522 to [phone])  Requested for: 54NQG5531; Last   Rx:46Pio6702 Ordered   4  Multi-Vitamin TABS; Therapy: (Recorded:81Avj9071) to Recorded   5   Sulfamethoxazole-Trimethoprim 800-160 MG Oral Tablet (Bactrim DS); TAKE 1 TABLET   TWICE DAILY WITH FOOD; Therapy: 05GXM0984 to (JZEFUVTY:68XBW5505)  Requested for: 83QBV9728; Last   Rx:16Jan2018 Ordered    Allergies    1   No Known Drug Allergies    Signatures   Electronically signed by : Sue Lindsey, ; Jan 22 2018  8:35AM EST                       (Author)

## 2018-01-24 NOTE — MISCELLANEOUS
Message   Recorded as Task   Date: 01/22/2018 12:05 PM, Created By: Eleazar Bautista   Task Name: Care Coordination   Assigned To: Fabrice Lees   Regarding Patient: Franny Hurtado, Status: In Progress   Comment:    Laquita Kimball - 22 Jan 2018 12:05 PM     TASK CREATED  please call Kristina Lynn -   radiologist Dr Roberto Severino called - he feels that due to the nature of her fibroid - - irregular edges- he does not recommend uterine artery emboliztion  At this time I would recommend hysterectomy to remove both her uterus and her fibroid  I would like her to come in so we can discuss this further  Alexa Andujar - 22 Jan 2018 12:24 PM     TASK IN PROGRESS   Alexa Anduajr - 22 Jan 2018 12:26 PM     TASK EDITED  Gissell Sarabia,    Patient has an appointment with you on 01/26 in [place] for a f/u  Per this task, KTM recommends pt get a hysterectomy  Do you want pt to keep her appointment with you or make one with KTM instead? Please advise  Thanks! Rositayvette Vel - 22 Jan 2018 2:57 PM     TASK REPLIED TO: Previously Assigned To [facility] - her note is based on the results of the MRI, I presume  She does not need a visit with me if we can get her in for a visit with KTM in the near future  Thanks   Alexa Andujar - 22 Jan 2018 2:59 PM     TASK IN PROGRESS   Alexa Andujar - 22 Jan 2018 3:02 PM     TASK EDITED  [facility] for pt to cb       ext: [phone]   Alexa Andujar - 22 Jan 2018 3:47 PM     TASK EDITED  Patient returned call - she is scheduled to see [address] 01/29 to discuss a recommended hysterectomy  Active Problems    1  Abnormal vaginal bleeding (623 8) (N93 9)   2  Anemia (285 9) (D64 9)   3  Endometritis (615 9) (N71 9)   4  Fibroids (218 9) (D25 9)   5  Hypercholesteremia (272 0) (E78 00)   6  Iron deficiency anemia (280 9) (D50 9)   7  Lesion of lip (528 5) (K13 0)   8  Menorrhagia with regular cycle (626 2) (N92 0)   9  Pelvic pain (R10 2)   10  Pelvic pain in female (625 9) (R10 2)   11  Pre-diabetes (790 29) (R73 03)   12  Pulsatile tinnitus of right ear (388 30) (H93 A1)   13  Urinary frequency (788 41) (R35 0)    Current Meds   1  Doxycycline Hyclate 100 MG Oral Tablet; ONE TABLET BY MOUTH TWO TIMES DAILY; Therapy: 64TBP8106 to [phone])  Requested for: 18ISA5018; Last   Rx:2018 Ordered   2  Ferrous Sulfate 325 (65 Fe) MG Oral Tablet; TAKE 1 TABLET 3 TIMES DAILY WITH   FOOD; Therapy: 92TBR0270 to (Evaluate:2018)  Requested for: 68GPW5240; Last   Rx:2017 Ordered   3  MetroNIDAZOLE 500 MG Oral Tablet; Take 1 tablet twice daily; Therapy: 71FNZ2996 to [phone])  Requested for: 00IYB6812; Last   Rx:2018 Ordered   4  Multi-Vitamin TABS; Therapy: (Recorded:68Sex1672) to Recorded   5  Sulfamethoxazole-Trimethoprim 800-160 MG Oral Tablet (Bactrim DS); TAKE 1 TABLET   TWICE DAILY WITH FOOD; Therapy: 07VIT8855 to ([id number])  Requested for: 24IQK2747; Last   Rx:2018 Ordered    Allergies    1   No Known Drug Allergies    Signatures   Electronically signed by : Ana Bentley, ; 2018  3:48PM EST                       (Author)

## 2018-01-26 ENCOUNTER — OFFICE VISIT (OUTPATIENT)
Dept: OBGYN CLINIC | Facility: CLINIC | Age: 43
End: 2018-01-26
Payer: COMMERCIAL

## 2018-01-26 VITALS
DIASTOLIC BLOOD PRESSURE: 66 MMHG | WEIGHT: 136.2 LBS | HEIGHT: 64 IN | BODY MASS INDEX: 23.25 KG/M2 | SYSTOLIC BLOOD PRESSURE: 110 MMHG

## 2018-01-26 DIAGNOSIS — Z87.42: ICD-10-CM

## 2018-01-26 DIAGNOSIS — Z09 FOLLOW-UP EXAM: Primary | ICD-10-CM

## 2018-01-26 DIAGNOSIS — N85.8 UTERINE MASS: ICD-10-CM

## 2018-01-26 PROBLEM — N71.9 ENDOMETRITIS: Status: ACTIVE | Noted: 2018-01-18

## 2018-01-26 PROBLEM — N92.0 MENORRHAGIA WITH REGULAR CYCLE: Status: ACTIVE | Noted: 2017-12-04

## 2018-01-26 PROBLEM — D50.9 IRON DEFICIENCY ANEMIA: Status: ACTIVE | Noted: 2017-11-07

## 2018-01-26 PROBLEM — D64.9 ANEMIA: Status: ACTIVE | Noted: 2017-10-26

## 2018-01-26 PROBLEM — D21.9 FIBROIDS: Status: ACTIVE | Noted: 2017-08-14

## 2018-01-26 PROBLEM — R35.0 INCREASED FREQUENCY OF URINATION: Status: ACTIVE | Noted: 2017-10-27

## 2018-01-26 PROBLEM — H93.A1 PULSATILE TINNITUS OF RIGHT EAR: Status: ACTIVE | Noted: 2017-09-26

## 2018-01-26 PROBLEM — E78.00 HYPERCHOLESTEREMIA: Status: ACTIVE | Noted: 2017-08-14

## 2018-01-26 PROBLEM — R73.03 PRE-DIABETES: Status: ACTIVE | Noted: 2017-08-14

## 2018-01-26 PROCEDURE — 99214 OFFICE O/P EST MOD 30 MIN: CPT | Performed by: NURSE PRACTITIONER

## 2018-01-26 RX ORDER — FERROUS SULFATE 325(65) MG
1 TABLET ORAL EVERY 8 HOURS
COMMUNITY
Start: 2017-11-07

## 2018-01-26 RX ORDER — MULTIVITAMIN
TABLET ORAL
COMMUNITY

## 2018-01-26 NOTE — ASSESSMENT & PLAN NOTE
Sx of endometritis resolved  Exam with scant pink discharge, otherwise unremarkable  Advised that she continues to observe closely for further sx of infection and has low threshold to call  The patient expresses anxiety assoc with cause of this infection - we reviewed common causes of endometritis at length  Pt verbalizes understanding that it is impossible to sterilize the vagina while performing EMB but that all proper precautions were taken

## 2018-01-26 NOTE — ASSESSMENT & PLAN NOTE
Results of recent MRI reviewed at pt's request  Discussed mass with atypical features - consider fibroid vs neoplasm  Sergio Freire is aware that Dr Ulysses Scudder has advised hysterectomy - she was informed by RN call last week  The patient is scheduled to see KTM on Monday to discuss further  KTM notified today of current pt status and resolved endometritis

## 2018-01-26 NOTE — PROGRESS NOTES
Assessment/Plan:    H/O acute endometritis  Sx of endometritis resolved  Exam with scant pink discharge, otherwise unremarkable  Advised that she continues to observe closely for further sx of infection and has low threshold to call  The patient expresses anxiety assoc with cause of this infection - we reviewed common causes of endometritis at length  Pt verbalizes understanding that it is impossible to sterilize the vagina while performing EMB but that all proper precautions were taken  Uterine mass  Results of recent MRI reviewed at pt's request  Discussed mass with atypical features - consider fibroid vs neoplasm  Jose Hill is aware that Dr Cooper Morrissey has advised hysterectomy - she was informed by RN call last week  The patient is scheduled to see KTM on Monday to discuss further  KTM notified today of current pt status and resolved endometritis  Diagnoses and all orders for this visit:    Follow-up exam    H/O acute endometritis    Uterine mass    Other orders  -     ferrous sulfate 325 (65 Fe) mg tablet; Take 1 tablet by mouth every 8 (eight) hours  -     Multiple Vitamin (MULTI-VITAMIN DAILY) TABS; Take by mouth        Problem List Items Addressed This Visit     Uterine mass     Results of recent MRI reviewed at pt's request  Discussed mass with atypical features - consider fibroid vs neoplasm  Jose Hill is aware that Dr Cooper Morrissey has advised hysterectomy - she was informed by RN call last week  The patient is scheduled to see KTM on Monday to discuss further  KTM notified today of current pt status and resolved endometritis  H/O acute endometritis     Sx of endometritis resolved  Exam with scant pink discharge, otherwise unremarkable  Advised that she continues to observe closely for further sx of infection and has low threshold to call  The patient expresses anxiety assoc with cause of this infection - we reviewed common causes of endometritis at length   Pt verbalizes understanding that it is impossible to sterilize the vagina while performing EMB but that all proper precautions were taken  Follow-up exam - Primary          Subjective:      Patient ID: Pilar Garay is a 38 y o  female  This patient presents for f/u on endometritis  She reports sx of foul smelling heavy bleeding and pelvic pain have resolved since starting abx  She does continue to have a scant amt of pink discharge, otherwise denies acute gyn complaints and denies fever/chills/myalgia  She desires to review MRI results and has appt scheduled on Monday with Dr Fidencio Escobar to discuss recommendations for hysterectomy  Vaginal Discharge   The patient's primary symptoms include vaginal discharge  The patient's pertinent negatives include no pelvic pain  Pertinent negatives include no dysuria  The following portions of the patient's history were reviewed and updated as appropriate: allergies, current medications, past family history, past medical history, past social history, past surgical history and problem list     Review of Systems   Constitutional: Negative  HENT: Negative  Eyes: Negative  Respiratory: Negative  Cardiovascular: Negative  Gastrointestinal: Negative  Endocrine: Negative  Genitourinary: Positive for vaginal discharge  Negative for dysuria, pelvic pain, vaginal bleeding and vaginal pain  Musculoskeletal: Negative  Skin: Negative  Allergic/Immunologic: Negative  Neurological: Negative  Hematological: Negative  Psychiatric/Behavioral: Negative  Objective:     Physical Exam   Constitutional: She appears well-developed and well-nourished  HENT:   Head: Normocephalic  Eyes: Pupils are equal, round, and reactive to light  Genitourinary: Rectum normal  There is no rash or tenderness on the right labia  There is no rash or tenderness on the left labia  Uterus is enlarged  Uterus is not tender   Cervix exhibits no motion tenderness and no friability  Right adnexum displays no mass, no tenderness and no fullness  Left adnexum displays no mass, no tenderness and no fullness  No erythema or tenderness in the vagina  No foreign body in the vagina  Vaginal discharge found  Psychiatric: Her speech is normal and behavior is normal  Judgment and thought content normal  Her mood appears anxious   Cognition and memory are normal

## 2018-01-29 ENCOUNTER — OFFICE VISIT (OUTPATIENT)
Dept: OBGYN CLINIC | Facility: CLINIC | Age: 43
End: 2018-01-29
Payer: COMMERCIAL

## 2018-01-29 VITALS
SYSTOLIC BLOOD PRESSURE: 108 MMHG | DIASTOLIC BLOOD PRESSURE: 64 MMHG | HEIGHT: 64 IN | WEIGHT: 137 LBS | BODY MASS INDEX: 23.39 KG/M2

## 2018-01-29 DIAGNOSIS — N92.0 MENORRHAGIA WITH REGULAR CYCLE: ICD-10-CM

## 2018-01-29 DIAGNOSIS — D25.9 UTERINE LEIOMYOMA, UNSPECIFIED LOCATION: Primary | ICD-10-CM

## 2018-01-29 DIAGNOSIS — R10.2 PELVIC PAIN: ICD-10-CM

## 2018-01-29 PROBLEM — Z09 FOLLOW-UP EXAM: Status: RESOLVED | Noted: 2018-01-26 | Resolved: 2018-01-29

## 2018-01-29 PROBLEM — N71.9 ENDOMETRITIS: Status: RESOLVED | Noted: 2018-01-18 | Resolved: 2018-01-29

## 2018-01-29 PROBLEM — D64.9 ANEMIA: Status: RESOLVED | Noted: 2017-10-26 | Resolved: 2018-01-29

## 2018-01-29 PROBLEM — H93.A1 PULSATILE TINNITUS OF RIGHT EAR: Status: RESOLVED | Noted: 2017-09-26 | Resolved: 2018-01-29

## 2018-01-29 PROBLEM — N85.8 UTERINE MASS: Status: RESOLVED | Noted: 2018-01-26 | Resolved: 2018-01-29

## 2018-01-29 PROBLEM — K13.0 LESION OF LIP: Status: ACTIVE | Noted: 2017-08-14

## 2018-01-29 PROBLEM — Z87.42: Status: RESOLVED | Noted: 2018-01-26 | Resolved: 2018-01-29

## 2018-01-29 PROCEDURE — 99215 OFFICE O/P EST HI 40 MIN: CPT | Performed by: OBSTETRICS & GYNECOLOGY

## 2018-01-29 RX ORDER — PHENAZOPYRIDINE HYDROCHLORIDE 100 MG/1
100 TABLET, FILM COATED ORAL ONCE
Status: CANCELLED | OUTPATIENT
Start: 2018-01-29

## 2018-01-29 NOTE — PROGRESS NOTES
Assessment/Plan:    Fibroids  Plan for Total Laparoscopic Hysterectomy with bilateral salpingectomy             Problem List Items Addressed This Visit        Genitourinary    Fibroids - Primary     Plan for Total Laparoscopic Hysterectomy with bilateral salpingectomy                Other    Pelvic pain    RESOLVED: Menorrhagia with regular cycle            Subjective:      Patient ID: Chelsey Reddy is a 38 y o  female  35year old  with menorrhagia  Was evaluated 1 month ago and an ultrasound was obtained showing 7cm fundal fibroid  Originally interested in [place] ablation but decided to pursue uterine artery embolization  As part of workup and MRI was ordered  Plan was for uterine artery embolization but due to MRI result, radiology recommended against [place] due to atypical nature of fibroid imaging  Endometrial biopsy result benign  Here to discuss options  Pelvic Pain   The patient's primary symptoms include pelvic pain (Symptom described as constant pressure or fullness) and vaginal bleeding (after endometrial biopsy bleeding increased, diagnosed with Endometritis and UTI  Now resolving and feeling better  )  The patient's pertinent negatives include no genital itching, genital lesions, genital odor or vaginal discharge  This is a chronic problem  The current episode started more than 1 year ago  The problem occurs constantly  The problem has been unchanged  The pain is mild  Affected Side: suprapubic  She is not pregnant  Associated symptoms include frequency and urgency  Pertinent negatives include no chills, constipation, diarrhea, fever, nausea, sore throat or vomiting  The symptoms are aggravated by activity and tactile pressure  She has tried nothing for the symptoms  The treatment provided no relief  She is sexually active  Her menstrual history has been regular         The following portions of the patient's history were reviewed and updated as appropriate:   She  has a past medical history of Endometritis (01/2018); Fibroid; and Sickle cell trait ([facility] [address] )  She  does not have any pertinent problems on file  She  has a past surgical history that includes Hernia repair  Her family history includes Asthma in her son; Diabetes in her father; Eczema in her son; Hypertension in her father; Thyroid disease in her mother  She  reports that she has never smoked  She has never used smokeless tobacco  She reports that she drinks alcohol  She reports that she does not use drugs  Current Outpatient Prescriptions   Medication Sig Dispense Refill    ferrous sulfate 325 (65 Fe) mg tablet Take 1 tablet by mouth every 8 (eight) hours      Multiple Vitamin (MULTI-VITAMIN DAILY) TABS Take by mouth       No current facility-administered medications for this visit  Current Outpatient Prescriptions on File Prior to Visit   Medication Sig    ferrous sulfate 325 (65 Fe) mg tablet Take 1 tablet by mouth every 8 (eight) hours    Multiple Vitamin (MULTI-VITAMIN DAILY) TABS Take by mouth     No current facility-administered medications on file prior to visit  She is allergic to naproxen       Review of Systems   Constitutional: Negative for activity change, appetite change, chills, fatigue and fever  HENT: Negative for rhinorrhea, sneezing and sore throat  Eyes: Negative for visual disturbance  Respiratory: Negative for cough, shortness of breath and wheezing  Cardiovascular: Negative for chest pain, palpitations and leg swelling  Gastrointestinal: Negative for abdominal distention, constipation, diarrhea, nausea and vomiting  Genitourinary: Positive for frequency, pelvic pain (Symptom described as constant pressure or fullness) and urgency  Negative for difficulty urinating and vaginal discharge  Neurological: Negative for syncope and light-headedness  Objective:     Physical Exam   Constitutional: She is oriented to person, place, and time   She appears well-developed and well-nourished  No distress  Cardiovascular: Normal rate, regular rhythm and normal heart sounds  Exam reveals no gallop and no friction rub  No murmur heard  Pulmonary/Chest: Effort normal and breath sounds normal  No respiratory distress  Abdominal: Soft  Bowel sounds are normal  She exhibits no distension  There is no tenderness  There is no rebound and no guarding  Genitourinary: There is no rash, tenderness or lesion on the right labia  There is no rash, tenderness or lesion on the left labia  Uterus is enlarged (14 week size, large fibroid at fundus)  Uterus is not tender  Right adnexum displays no mass and no tenderness  Left adnexum displays no mass and no tenderness  No erythema or bleeding in the vagina  No vaginal discharge found  Neurological: She is alert and oriented to person, place, and time  She has normal reflexes  Skin: She is not diaphoretic

## 2018-01-29 NOTE — PATIENT INSTRUCTIONS
Laparoscopic Hysterectomy   WHAT YOU SHOULD KNOW:   Laparoscopic hysterectomy [facility]) is surgery to remove your uterus only (partial hysterectomy), or your uterus and cervix (total hysterectomy)  Other organs, such as your ovaries and fallopian tubes, may also be removed  CARE AGREEMENT:   You have the right to help plan your care  Learn about your health condition and how it may be treated  Discuss treatment options with your caregivers to decide what care you want to receive  You always have the right to refuse treatment  RISKS:   · You may bleed more than expected or get an infection  You may have damage to your bladder, ureters, or bowels  The surgeon may need to make larger incisions in your abdomen than expected  You may get a blood clot in your leg  This may become life-threatening  After this surgery, you will not be able to become pregnant  You will go through menopause if your ovaries are removed  · You may have vaginal bleeding, pelvic pain, or problems urinating for a time after surgery  You may get scar tissue in your abdomen that blocks your intestine or causes pelvic pain  If you had a partial hysterectomy, you will need to have regular Pap tests to check for cancer cells in your cervix  If you have cancer, this surgery may not take it away completely, or the cancer may return  If you do not have the hysterectomy, your signs and symptoms could get worse  GETTING READY:   The week before your surgery:   · Write down the correct date, time, and location of your surgery  · Arrange a ride home  Ask a family member or friend to drive you home after your surgery or procedure  Do not drive yourself home  · Bring your medicine bottles or a list of your medicines when you see your caregiver  Tell your caregiver if you are allergic to any medicine  Tell your caregiver if you use any herbs, food supplements, or over-the-counter medicine       · Ask your caregiver if you need to stop using aspirin or any other prescribed or over-the-counter medicine before your procedure or surgery  · You may need to donate blood before your surgery  Your blood is stored in case you need it during or after your surgery  · You may need to have tests done before the surgery, such as blood tests and a chest x-ray  Talk to your caregiver about these or other tests you may need  Write down the date, time, and location for each test   The night before your surgery:   · Ask caregivers about directions for eating and drinking  · Bowel cleansing may be needed  You may be given medicine to drink or an enema that will empty your bowel  An enema is liquid put into your rectum  The day of your surgery:   · Ask your caregiver before you take any medicine on the day of your surgery  Bring a list of all the medicines you take, or your pill bottles, with you to the hospital  Caregivers will check that your medicines will not interact poorly with the medicine you need for surgery  · You or a close family member will be asked to sign a legal document called a consent form  It gives caregivers permission to do the procedure or surgery  It also explains the problems that may happen, and your choices  Make sure all your questions are answered before you sign this form  · Caregivers may insert an intravenous tube (IV) into your vein  A vein in the arm is usually chosen  Through the IV tube, you may be given liquids and medicine  · An anesthesiologist will talk to you before your surgery  You may need medicine to keep you asleep or numb an area of your body during surgery  Tell caregivers if you or anyone in your family has had a problem with anesthesia in the past   TREATMENT:   What will happen:  One or more small incisions will be made in your abdomen  Your surgeon will put a laparoscope and other tools into your abdomen through the incisions  The laparoscope is a long metal tube with a light and camera on the end   Your abdomen will be filled with a gas called carbon dioxide  This allows your surgeon to see inside your abdomen  Your uterus will be cut free from your abdomen so it can be removed  Your cervix, fallopian tubes, ovaries, and lymph nodes may also be removed  These structures will be removed through the incisions in your abdomen or through your vagina  The incisions will be closed with stitches  After your surgery: You will be taken to a room to rest until you are fully awake  Caregivers will monitor you closely for any problems  Do not get out of bed until your caregiver says it is okay  When your caregiver sees that you are okay, you will be able to go home or be taken to your hospital room  CONTACT A CAREGIVER IF:   · You cannot make it to your surgery  · You have a fever  · You get a cold or the flu  · You have questions or concerns about your surgery  SEEK CARE IMMEDIATELY IF:   · Your symptoms get worse  © 2014 [address] is for End User's use only and may not be sold, redistributed or otherwise used for commercial purposes  All illustrations and images included in CareNotes® are the copyrighted property of [employer]  or Omari Hodgson  The above information is an  only  It is not intended as medical advice for individual conditions or treatments  Talk to your doctor, nurse or pharmacist before following any medical regimen to see if it is safe and effective for you

## 2018-04-17 ENCOUNTER — TRANSCRIBE ORDERS (OUTPATIENT)
Dept: ADMINISTRATIVE | Facility: HOSPITAL | Age: 43
End: 2018-04-17

## 2018-04-17 DIAGNOSIS — Z12.31 VISIT FOR SCREENING MAMMOGRAM: Primary | ICD-10-CM

## 2018-04-23 ENCOUNTER — HOSPITAL ENCOUNTER (OUTPATIENT)
Dept: MAMMOGRAPHY | Facility: CLINIC | Age: 43
Discharge: HOME | End: 2018-04-23
Payer: COMMERCIAL

## 2018-04-23 DIAGNOSIS — Z12.31 VISIT FOR SCREENING MAMMOGRAM: ICD-10-CM

## 2018-04-23 DIAGNOSIS — Z12.31 ENCOUNTER FOR SCREENING MAMMOGRAM FOR MALIGNANT NEOPLASM OF BREAST: Primary | ICD-10-CM

## 2018-04-23 PROCEDURE — 77063 BREAST TOMOSYNTHESIS BI: CPT

## 2018-04-23 PROCEDURE — 77067 SCR MAMMO BI INCL CAD: CPT

## 2018-04-27 ENCOUNTER — ANNUAL EXAM (OUTPATIENT)
Dept: OBGYN CLINIC | Age: 43
End: 2018-04-27
Payer: COMMERCIAL

## 2018-04-27 VITALS
HEIGHT: 64 IN | DIASTOLIC BLOOD PRESSURE: 72 MMHG | WEIGHT: 140 LBS | BODY MASS INDEX: 23.9 KG/M2 | SYSTOLIC BLOOD PRESSURE: 120 MMHG

## 2018-04-27 DIAGNOSIS — Z01.411 ENCOUNTER FOR GYNECOLOGICAL EXAMINATION WITH ABNORMAL FINDING: Primary | ICD-10-CM

## 2018-04-27 DIAGNOSIS — N92.0 MENORRHAGIA WITH REGULAR CYCLE: ICD-10-CM

## 2018-04-27 DIAGNOSIS — D25.9 UTERINE LEIOMYOMA, UNSPECIFIED LOCATION: ICD-10-CM

## 2018-04-27 PROBLEM — R73.03 PREDIABETES: Status: ACTIVE | Noted: 2018-02-13

## 2018-04-27 PROCEDURE — G0145 SCR C/V CYTO,THINLAYER,RESCR: HCPCS | Performed by: NURSE PRACTITIONER

## 2018-04-27 PROCEDURE — 99396 PREV VISIT EST AGE 40-64: CPT | Performed by: NURSE PRACTITIONER

## 2018-04-27 PROCEDURE — 87624 HPV HI-RISK TYP POOLED RSLT: CPT | Performed by: NURSE PRACTITIONER

## 2018-04-27 RX ORDER — ASCORBIC ACID 500 MG
500 TABLET ORAL DAILY
COMMUNITY

## 2018-04-27 NOTE — PATIENT INSTRUCTIONS
Uterine Fibroids   WHAT YOU NEED TO KNOW:   What are uterine fibroids? Uterine fibroids are growths found inside your uterus (womb)  Uterine fibroids also may be called tumors (lumps) or leiomyomas  Uterine fibroids often appear in groups, or you may have only one  They can be small or large, and they can grow in size  They are almost always benign (not cancer) and likely will not spread to other parts of your body  What increases my risk of getting uterine fibroids? The cause of uterine fibroids is not clear  Ask your healthcare provider about these and other risk factors for uterine fibroids:  · Heredity:  Your risk for uterine fibroids may increase if a close family member also has fibroids  · Hormone imbalance:  Hormones are chemicals that affect your growth  Too many hormones may cause uterine fibroids or make them grow  · Early onset of menstrual periods: If you started your period at an early age, you may be at risk for uterine fibroids  · Childbearing age:  Uterine fibroids occur more often in women of childbearing age  They are even more common when you are 36to 61years old  They may grow when you are pregnant and shrink after you no longer have a monthly period  · Excess weight:  Too much body weight may increase your hormone levels and lead to uterine fibroids  Ask your healthcare provider about your ideal weight for your height  What are the signs and symptoms of uterine fibroids? You may have no signs or symptoms  Symptoms depend on the size, type, and number of fibroids you have  Symptoms also depend on where the fibroids are inside your uterus  Signs and symptoms of fibroids include the following:  · Heavy or painful menstrual bleeding  · Pelvic pressure and pain  You may have increased pelvic pain during sex  · Constipation or pain when you have a bowel movement (BM)  · Frequent need to urinate  How are uterine fibroids diagnosed?   Ask your healthcare provider about these and other tests that you may need:  · Blood tests: You may need blood taken to give caregivers information about how your body is working  The blood may be taken from your hand, arm, or IV  · Pelvic exam:  This is also called an internal or vaginal exam  During a pelvic exam, your healthcare provider gently puts a speculum into your vagina  A speculum is a tool that opens your vagina  This lets your healthcare provider see your cervix (bottom part of your uterus)  With gloved hands, your healthcare provider will check the size and shape of your uterus and ovaries  · Vaginal ultrasound:  During this test, your healthcare provider places a small wand in your vagina  Sound waves from the wand show pictures of the inside of your uterus (womb) and ovaries  · Biopsy:  A biopsy is a tissue sample of a fibroid that your healthcare provider takes from your uterus for testing  How are uterine fibroids treated? You may not need treatment for your fibroids if you do not have symptoms  The following treatments may shrink your fibroids and help your pain:  · Medicines:     ¨ Hormone medicine: This medicine changes the level of certain hormones and may then help shrink your fibroids  ¨ Contraceptives: These medicines help prevent pregnancy  They also may help shrink your fibroids  ¨ Nonsteroidal anti-inflammatory medicine: This group of medicine is also called NSAIDs  Nonsteroidal anti-inflammatory medicine may help decrease pain, fever, and swelling  This medicine can be bought without a doctor's order  This medicine can cause stomach bleeding or kidney problems in certain people  · Surgery: The type of surgery you may have depends on the size, number, and location of your fibroids  Ask your healthcare provider for more information about the following:     ¨ Embolization: This surgery blocks or slows the flow of blood to the fibroid  This may make your fibroids shrink or disappear  ¨ Myomectomy: This surgery removes your uterine fibroids  ¨ Hysterectomy:  For this surgery, your healthcare provider removes your uterus from your body  You may need a hysterectomy if your condition is severe (very bad)  After this surgery, you will no longer be able to have children  When should I contact my healthcare provider? · Your symptoms, such as heavy bleeding, pain, or pelvic pressure, worsen  · You feel weak and are more tired than usual      · You do not feel like your bladder is empty after you urinate  You also may urinate small amounts more often  · You have more trouble having or are not able to have a BM  · You have new or worse hot flashes  · You have any questions about your condition or care  When should I seek immediate care or call 911? · Your heart begins to race, and you feel faint  · You begin to pass large blood clots from your vagina  CARE AGREEMENT:   You have the right to help plan your care  Learn about your health condition and how it may be treated  Discuss treatment options with your caregivers to decide what care you want to receive  You always have the right to refuse treatment  The above information is an  only  It is not intended as medical advice for individual conditions or treatments  Talk to your doctor, nurse or pharmacist before following any medical regimen to see if it is safe and effective for you  © 2017 [address] [employer] Information is for End User's use only and may not be sold, redistributed or otherwise used for commercial purposes  All illustrations and images included in CareNotes® are the copyrighted property of [employer]  or Omari Hodgson

## 2018-04-27 NOTE — PROGRESS NOTES
Assessment/Plan:    No problem-specific Assessment & Plan notes found for this encounter  Diagnoses and all orders for this visit:    Encounter for gynecological examination with abnormal finding    Uterine leiomyoma, unspecified location    Other orders  -     ascorbic acid (VITAMIN C) 500 mg tablet; Take 500 mg by mouth daily        Call as needed, encouraged calcium/vit D in her diet, all questions answered  Will reschedule appt for surgery  Subjective:      Patient ID: Meghana Bourne is a 38 y o  female  Pleasant 38 y o  premenopausal female here for annual exam  She denies any issues with menstrual/vaginal bleeding for the past 2 cycles but is still considering a hysterectomy d/t her fibroid hx  EMB nondiagnostic 2017  Denies history of abnormal pap smears, last Pap NIL 2016, + pap today but is bleeding moderately  Denies vaginal issues  Denies pelvic pain  The following portions of the patient's history were reviewed and updated as appropriate:   She  has a past medical history of Endometritis (01/2018); Fibroid; and Sickle cell trait ([facility] [address] )  She   Patient Active Problem List    Diagnosis Date Noted    Prediabetes 02/13/2018    Pelvic pain 01/18/2018    Iron deficiency anemia 11/07/2017    Increased frequency of urination 10/27/2017    Fibroids 08/14/2017    Hypercholesteremia 08/14/2017    Pre-diabetes 08/14/2017    Lesion of lip 08/14/2017     She  has a past surgical history that includes Hernia repair  Her family history includes Asthma in her son; Diabetes in her father; Eczema in her son; Hypertension in her father; Ovarian cancer in her maternal aunt; Thyroid disease in her mother  She  reports that she has never smoked  She has never used smokeless tobacco  She reports that she drinks alcohol  She reports that she does not use drugs    Current Outpatient Prescriptions   Medication Sig Dispense Refill    ascorbic acid (VITAMIN C) 500 mg tablet Take 500 mg by mouth daily      ferrous sulfate 325 (65 Fe) mg tablet Take 1 tablet by mouth every 8 (eight) hours      Multiple Vitamin (MULTI-VITAMIN DAILY) TABS Take by mouth       No current facility-administered medications for this visit  She is allergic to naproxen  OB History    Para Term  AB Living   4 1     2 2   SAB TAB Ectopic Multiple Live Births     0     2      # Outcome Date GA Lbr Frandy/2nd Weight Sex Delivery Anes PTL Lv   4 Para 99    M Vag-Spont   DESMOND   3  94    M Vag-Spont   DESMOND      Complications: Failure to Progress in Second Stage   2 AB            1 AB                 Works for [employer] in [employer]   Constitutional: Negative for chills, fatigue, fever and unexpected weight change  Respiratory: Negative for shortness of breath  Gastrointestinal: Negative for anal bleeding, blood in stool, constipation and diarrhea  Genitourinary: Positive for menstrual problem  Negative for difficulty urinating, dysuria and hematuria  Objective:      /72 (BP Location: Right arm, Patient Position: Sitting)   Ht 5' 3 5" (1 613 m)   Wt 63 5 kg (140 lb)   LMP 2018   Breastfeeding? No   BMI 24 41 kg/m²          Physical Exam   Constitutional: She appears well-developed and well-nourished  No distress  HENT:   Head: Normocephalic  Neck: Normal range of motion  Neck supple  Pulmonary/Chest: Effort normal  Right breast exhibits no inverted nipple, no mass, no nipple discharge, no skin change and no tenderness  Left breast exhibits no inverted nipple, no mass, no nipple discharge, no skin change and no tenderness  Breasts are symmetrical    Abdominal: Soft  Genitourinary: No breast swelling, tenderness, discharge or bleeding  Pelvic exam was performed with patient supine  No labial fusion  There is no rash, tenderness, lesion or injury on the right labia  There is no rash, tenderness, lesion or injury on the left labia  Uterus is not deviated, not enlarged, not fixed and not tender  Cervix exhibits no motion tenderness, no discharge and no friability  Right adnexum displays no mass, no tenderness and no fullness  Left adnexum displays no mass, no tenderness and no fullness  No erythema or tenderness in the vagina  No foreign body in the vagina  No signs of injury around the vagina  No vaginal discharge found  Genitourinary Comments: MODERATE MENSES TODAY   Lymphadenopathy:        Right: No inguinal adenopathy present  Left: No inguinal adenopathy present

## 2018-05-01 LAB — HPV RRNA GENITAL QL NAA+PROBE: NORMAL

## 2018-05-02 LAB
LAB AP GYN PRIMARY INTERPRETATION: NORMAL
Lab: NORMAL

## 2018-05-03 ENCOUNTER — TELEPHONE (OUTPATIENT)
Dept: OBGYN CLINIC | Facility: CLINIC | Age: 43
End: 2018-05-03

## 2018-07-13 ENCOUNTER — TRANSCRIBE ORDERS (OUTPATIENT)
Dept: LAB | Facility: HOSPITAL | Age: 43
End: 2018-07-13

## 2018-07-13 DIAGNOSIS — Z00.8 HEALTH EXAMINATION IN POPULATION SURVEYS: Primary | ICD-10-CM

## 2018-07-18 ENCOUNTER — APPOINTMENT (OUTPATIENT)
Dept: LAB | Facility: HOSPITAL | Age: 43
End: 2018-07-18
Payer: COMMERCIAL

## 2018-07-18 DIAGNOSIS — Z00.8 HEALTH EXAMINATION IN POPULATION SURVEYS: ICD-10-CM

## 2018-07-18 LAB
CHOLEST SERPL-MCNC: 212 MG/DL (ref 50–200)
EST. AVERAGE GLUCOSE BLD GHB EST-MCNC: 114 MG/DL
HBA1C MFR BLD: 5.6 % (ref 4.2–6.3)
HDLC SERPL-MCNC: 45 MG/DL (ref 40–60)
LDLC SERPL CALC-MCNC: 110 MG/DL (ref 0–100)
NONHDLC SERPL-MCNC: 167 MG/DL
TRIGL SERPL-MCNC: 287 MG/DL

## 2018-07-18 PROCEDURE — 83036 HEMOGLOBIN GLYCOSYLATED A1C: CPT

## 2018-07-18 PROCEDURE — 36415 COLL VENOUS BLD VENIPUNCTURE: CPT

## 2018-07-18 PROCEDURE — 80061 LIPID PANEL: CPT

## 2018-09-10 ENCOUNTER — TELEPHONE (OUTPATIENT)
Dept: FAMILY MEDICINE CLINIC | Facility: CLINIC | Age: 43
End: 2018-09-10

## 2018-09-10 NOTE — TELEPHONE ENCOUNTER
Patient has a form that needs to be signed that she is free from communicable diseases and has no physical condition that prohibits her to care for children, you last saw her on 1/12/18 does she need an appointment to have this signed or can she drop it off

## 2019-04-29 ENCOUNTER — HOSPITAL ENCOUNTER (EMERGENCY)
Facility: HOSPITAL | Age: 44
Discharge: HOME | End: 2019-04-29
Attending: EMERGENCY MEDICINE
Payer: COMMERCIAL

## 2019-04-29 ENCOUNTER — APPOINTMENT (EMERGENCY)
Dept: RADIOLOGY | Facility: HOSPITAL | Age: 44
End: 2019-04-29
Payer: COMMERCIAL

## 2019-04-29 VITALS
TEMPERATURE: 98.6 F | OXYGEN SATURATION: 100 % | HEIGHT: 64 IN | HEART RATE: 92 BPM | WEIGHT: 151 LBS | DIASTOLIC BLOOD PRESSURE: 70 MMHG | BODY MASS INDEX: 25.78 KG/M2 | RESPIRATION RATE: 18 BRPM | SYSTOLIC BLOOD PRESSURE: 127 MMHG
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
## 2019-04-29 DIAGNOSIS — S16.1XXA STRAIN OF NECK MUSCLE, INITIAL ENCOUNTER: ICD-10-CM

## 2019-04-29 PROCEDURE — 73030 X-RAY EXAM OF SHOULDER: CPT

## 2019-04-29 PROCEDURE — 99284 EMERGENCY DEPT VISIT MOD MDM: CPT

## 2019-04-29 RX ORDER — IBUPROFEN 800 MG/1
800 TABLET ORAL 3 TIMES DAILY
Qty: 21 TABLET | Refills: 0 | Status: SHIPPED | OUTPATIENT
Start: 2019-04-29

## 2019-07-24 ENCOUNTER — HOSPITAL ENCOUNTER (OUTPATIENT)
Dept: MAMMOGRAPHY | Facility: CLINIC | Age: 44
Discharge: HOME | End: 2019-07-24
Payer: COMMERCIAL

## 2019-07-24 VITALS — BODY MASS INDEX: 26.29 KG/M2 | HEIGHT: 64 IN | WEIGHT: 154 LBS

## 2019-07-24 DIAGNOSIS — Z12.31 ENCOUNTER FOR SCREENING MAMMOGRAM FOR MALIGNANT NEOPLASM OF BREAST: ICD-10-CM

## 2019-07-24 PROCEDURE — 77063 BREAST TOMOSYNTHESIS BI: CPT

## 2019-07-24 PROCEDURE — 77067 SCR MAMMO BI INCL CAD: CPT

## 2019-07-26 ENCOUNTER — HOSPITAL ENCOUNTER (OUTPATIENT)
Dept: ULTRASOUND IMAGING | Facility: CLINIC | Age: 44
Discharge: HOME | End: 2019-07-26
Payer: COMMERCIAL

## 2019-07-26 DIAGNOSIS — R92.8 ABNORMAL MAMMOGRAM: ICD-10-CM

## 2019-07-26 PROCEDURE — 76642 ULTRASOUND BREAST LIMITED: CPT

## 2020-07-29 ENCOUNTER — TRANSCRIBE ORDERS (OUTPATIENT)
Dept: ADMINISTRATIVE | Facility: HOSPITAL | Age: 45
End: 2020-07-29

## 2020-07-29 DIAGNOSIS — Z12.31 SCREENING MAMMOGRAM FOR HIGH-RISK PATIENT: Primary | ICD-10-CM

## 2020-08-12 ENCOUNTER — HOSPITAL ENCOUNTER (OUTPATIENT)
Dept: MAMMOGRAPHY | Facility: CLINIC | Age: 45
Discharge: HOME | End: 2020-08-12
Payer: COMMERCIAL

## 2020-08-12 DIAGNOSIS — Z12.31 SCREENING MAMMOGRAM FOR HIGH-RISK PATIENT: ICD-10-CM

## 2020-08-12 PROCEDURE — 77063 BREAST TOMOSYNTHESIS BI: CPT

## 2020-08-12 PROCEDURE — 77067 SCR MAMMO BI INCL CAD: CPT

## 2021-01-21 DIAGNOSIS — Z23 ENCOUNTER FOR IMMUNIZATION: ICD-10-CM

## 2021-01-27 ENCOUNTER — IMMUNIZATIONS (OUTPATIENT)
Dept: FAMILY MEDICINE CLINIC | Facility: HOSPITAL | Age: 46
End: 2021-01-27

## 2021-01-27 DIAGNOSIS — Z23 ENCOUNTER FOR IMMUNIZATION: Primary | ICD-10-CM

## 2021-01-27 PROCEDURE — 0011A SARS-COV-2 / COVID-19 MRNA VACCINE (MODERNA) 100 MCG: CPT

## 2021-01-27 PROCEDURE — 91301 SARS-COV-2 / COVID-19 MRNA VACCINE (MODERNA) 100 MCG: CPT

## 2021-02-24 ENCOUNTER — IMMUNIZATIONS (OUTPATIENT)
Dept: FAMILY MEDICINE CLINIC | Facility: HOSPITAL | Age: 46
End: 2021-02-24

## 2021-02-24 DIAGNOSIS — Z23 ENCOUNTER FOR IMMUNIZATION: Primary | ICD-10-CM

## 2021-02-24 PROCEDURE — 91301 SARS-COV-2 / COVID-19 MRNA VACCINE (MODERNA) 100 MCG: CPT

## 2021-02-24 PROCEDURE — 0012A SARS-COV-2 / COVID-19 MRNA VACCINE (MODERNA) 100 MCG: CPT

## 2021-10-04 ENCOUNTER — HOSPITAL ENCOUNTER (OUTPATIENT)
Dept: MAMMOGRAPHY | Facility: CLINIC | Age: 46
Discharge: HOME | End: 2021-10-04
Payer: COMMERCIAL

## 2021-10-04 VITALS — BODY MASS INDEX: 24.92 KG/M2 | WEIGHT: 146 LBS | HEIGHT: 64 IN

## 2021-10-04 DIAGNOSIS — Z12.31 ENCOUNTER FOR SCREENING MAMMOGRAM FOR MALIGNANT NEOPLASM OF BREAST: ICD-10-CM

## 2021-10-04 PROCEDURE — 77063 BREAST TOMOSYNTHESIS BI: CPT

## 2021-10-04 PROCEDURE — 77067 SCR MAMMO BI INCL CAD: CPT

## 2021-12-08 ENCOUNTER — IMMUNIZATIONS (OUTPATIENT)
Dept: FAMILY MEDICINE CLINIC | Facility: HOSPITAL | Age: 46
End: 2021-12-08

## 2021-12-08 DIAGNOSIS — Z23 ENCOUNTER FOR IMMUNIZATION: Primary | ICD-10-CM

## 2021-12-08 PROCEDURE — 0064A COVID-19 MODERNA VACC 0.25 ML BOOSTER: CPT

## 2021-12-08 PROCEDURE — 91306 COVID-19 MODERNA VACC 0.25 ML BOOSTER: CPT

## 2021-12-12 ENCOUNTER — HOSPITAL ENCOUNTER (EMERGENCY)
Facility: HOSPITAL | Age: 46
Discharge: HOME | End: 2021-12-12
Attending: EMERGENCY MEDICINE
Payer: COMMERCIAL

## 2021-12-12 VITALS
WEIGHT: 145 LBS | RESPIRATION RATE: 18 BRPM | DIASTOLIC BLOOD PRESSURE: 65 MMHG | HEIGHT: 64 IN | OXYGEN SATURATION: 97 % | HEART RATE: 82 BPM | TEMPERATURE: 97.9 F | BODY MASS INDEX: 24.75 KG/M2 | SYSTOLIC BLOOD PRESSURE: 127 MMHG

## 2021-12-12 DIAGNOSIS — R11.2 NAUSEA & VOMITING: ICD-10-CM

## 2021-12-12 DIAGNOSIS — B34.9 ACUTE VIRAL SYNDROME: Primary | ICD-10-CM

## 2021-12-12 LAB
ALBUMIN SERPL BCP-MCNC: 3.8 G/DL (ref 3.5–5)
ALP SERPL-CCNC: 90 U/L (ref 46–116)
ALT SERPL W P-5'-P-CCNC: 18 U/L (ref 12–78)
ANION GAP SERPL CALCULATED.3IONS-SCNC: 9 MMOL/L (ref 4–13)
AST SERPL W P-5'-P-CCNC: 14 U/L (ref 5–45)
BASOPHILS # BLD AUTO: 0.03 THOUSANDS/ΜL (ref 0–0.1)
BASOPHILS NFR BLD AUTO: 0 % (ref 0–1)
BILIRUB DIRECT SERPL-MCNC: 0.09 MG/DL (ref 0–0.2)
BILIRUB SERPL-MCNC: 0.47 MG/DL (ref 0.2–1)
BUN SERPL-MCNC: 10 MG/DL (ref 5–25)
CALCIUM SERPL-MCNC: 8.9 MG/DL (ref 8.3–10.1)
CHLORIDE SERPL-SCNC: 106 MMOL/L (ref 100–108)
CO2 SERPL-SCNC: 28 MMOL/L (ref 21–32)
CREAT SERPL-MCNC: 0.75 MG/DL (ref 0.6–1.3)
EOSINOPHIL # BLD AUTO: 0.13 THOUSAND/ΜL (ref 0–0.61)
EOSINOPHIL NFR BLD AUTO: 2 % (ref 0–6)
ERYTHROCYTE [DISTWIDTH] IN BLOOD BY AUTOMATED COUNT: 13.2 % (ref 11.6–15.1)
GFR SERPL CREATININE-BSD FRML MDRD: 95 ML/MIN/1.73SQ M
GLUCOSE SERPL-MCNC: 129 MG/DL (ref 65–140)
HCT VFR BLD AUTO: 42 % (ref 34.8–46.1)
HGB BLD-MCNC: 14 G/DL (ref 11.5–15.4)
IMM GRANULOCYTES # BLD AUTO: 0.02 THOUSAND/UL (ref 0–0.2)
IMM GRANULOCYTES NFR BLD AUTO: 0 % (ref 0–2)
LYMPHOCYTES # BLD AUTO: 0.64 THOUSANDS/ΜL (ref 0.6–4.47)
LYMPHOCYTES NFR BLD AUTO: 7 % (ref 14–44)
MCH RBC QN AUTO: 26.2 PG (ref 26.8–34.3)
MCHC RBC AUTO-ENTMCNC: 33.3 G/DL (ref 31.4–37.4)
MCV RBC AUTO: 79 FL (ref 82–98)
MONOCYTES # BLD AUTO: 0.36 THOUSAND/ΜL (ref 0.17–1.22)
MONOCYTES NFR BLD AUTO: 4 % (ref 4–12)
NEUTROPHILS # BLD AUTO: 7.55 THOUSANDS/ΜL (ref 1.85–7.62)
NEUTS SEG NFR BLD AUTO: 87 % (ref 43–75)
NRBC BLD AUTO-RTO: 0 /100 WBCS
PLATELET # BLD AUTO: 294 THOUSANDS/UL (ref 149–390)
PMV BLD AUTO: 10.7 FL (ref 8.9–12.7)
POTASSIUM SERPL-SCNC: 4.2 MMOL/L (ref 3.5–5.3)
PROT SERPL-MCNC: 7.9 G/DL (ref 6.4–8.2)
RBC # BLD AUTO: 5.34 MILLION/UL (ref 3.81–5.12)
SODIUM SERPL-SCNC: 143 MMOL/L (ref 136–145)
WBC # BLD AUTO: 8.73 THOUSAND/UL (ref 4.31–10.16)

## 2021-12-12 PROCEDURE — 96374 THER/PROPH/DIAG INJ IV PUSH: CPT

## 2021-12-12 PROCEDURE — C9113 INJ PANTOPRAZOLE SODIUM, VIA: HCPCS | Performed by: NURSE PRACTITIONER

## 2021-12-12 PROCEDURE — 99284 EMERGENCY DEPT VISIT MOD MDM: CPT | Performed by: NURSE PRACTITIONER

## 2021-12-12 PROCEDURE — 80048 BASIC METABOLIC PNL TOTAL CA: CPT | Performed by: NURSE PRACTITIONER

## 2021-12-12 PROCEDURE — 96361 HYDRATE IV INFUSION ADD-ON: CPT

## 2021-12-12 PROCEDURE — 99283 EMERGENCY DEPT VISIT LOW MDM: CPT

## 2021-12-12 PROCEDURE — 36415 COLL VENOUS BLD VENIPUNCTURE: CPT | Performed by: NURSE PRACTITIONER

## 2021-12-12 PROCEDURE — 96375 TX/PRO/DX INJ NEW DRUG ADDON: CPT

## 2021-12-12 PROCEDURE — 85025 COMPLETE CBC W/AUTO DIFF WBC: CPT | Performed by: NURSE PRACTITIONER

## 2021-12-12 PROCEDURE — 80076 HEPATIC FUNCTION PANEL: CPT | Performed by: NURSE PRACTITIONER

## 2021-12-12 RX ORDER — MAGNESIUM HYDROXIDE/ALUMINUM HYDROXICE/SIMETHICONE 120; 1200; 1200 MG/30ML; MG/30ML; MG/30ML
30 SUSPENSION ORAL ONCE
Status: COMPLETED | OUTPATIENT
Start: 2021-12-12 | End: 2021-12-12

## 2021-12-12 RX ORDER — ONDANSETRON 4 MG/1
4 TABLET, ORALLY DISINTEGRATING ORAL EVERY 8 HOURS PRN
Qty: 20 TABLET | Refills: 0 | Status: SHIPPED | OUTPATIENT
Start: 2021-12-12 | End: 2022-01-01

## 2021-12-12 RX ORDER — PANTOPRAZOLE SODIUM 40 MG/1
40 INJECTION, POWDER, FOR SOLUTION INTRAVENOUS ONCE
Status: COMPLETED | OUTPATIENT
Start: 2021-12-12 | End: 2021-12-12

## 2021-12-12 RX ORDER — ONDANSETRON 2 MG/ML
4 INJECTION INTRAMUSCULAR; INTRAVENOUS ONCE
Status: COMPLETED | OUTPATIENT
Start: 2021-12-12 | End: 2021-12-12

## 2021-12-12 RX ADMIN — SODIUM CHLORIDE 1000 ML: 0.9 INJECTION, SOLUTION INTRAVENOUS at 08:44

## 2021-12-12 RX ADMIN — ONDANSETRON 4 MG: 2 INJECTION INTRAMUSCULAR; INTRAVENOUS at 08:44

## 2021-12-12 RX ADMIN — PANTOPRAZOLE SODIUM 40 MG: 40 INJECTION, POWDER, FOR SOLUTION INTRAVENOUS at 08:44

## 2021-12-12 RX ADMIN — ALUMINA, MAGNESIA, AND SIMETHICONE ORAL SUSPENSION REGULAR STRENGTH 30 ML: 1200; 1200; 120 SUSPENSION ORAL at 08:43

## 2022-10-11 ENCOUNTER — HOSPITAL ENCOUNTER (OUTPATIENT)
Dept: MAMMOGRAPHY | Facility: CLINIC | Age: 47
Discharge: HOME | End: 2022-10-11
Payer: COMMERCIAL

## 2022-10-11 VITALS — HEIGHT: 64 IN | BODY MASS INDEX: 24.75 KG/M2 | WEIGHT: 145 LBS

## 2022-10-11 DIAGNOSIS — Z12.31 ENCOUNTER FOR SCREENING MAMMOGRAM FOR MALIGNANT NEOPLASM OF BREAST: ICD-10-CM

## 2022-10-11 PROCEDURE — 77063 BREAST TOMOSYNTHESIS BI: CPT

## 2022-10-11 PROCEDURE — 77067 SCR MAMMO BI INCL CAD: CPT

## 2022-10-14 ENCOUNTER — HOSPITAL ENCOUNTER (OUTPATIENT)
Dept: ULTRASOUND IMAGING | Facility: CLINIC | Age: 47
End: 2022-10-14
Payer: COMMERCIAL

## 2022-10-14 DIAGNOSIS — R92.8 ABNORMAL SCREENING MAMMOGRAM: ICD-10-CM

## 2022-10-14 PROCEDURE — 76642 ULTRASOUND BREAST LIMITED: CPT

## 2023-10-13 ENCOUNTER — HOSPITAL ENCOUNTER (OUTPATIENT)
Dept: MAMMOGRAPHY | Facility: CLINIC | Age: 48
End: 2023-10-13
Payer: COMMERCIAL

## 2023-10-13 VITALS — HEIGHT: 64 IN | BODY MASS INDEX: 24.75 KG/M2 | WEIGHT: 145 LBS

## 2023-10-13 DIAGNOSIS — Z12.31 ENCOUNTER FOR SCREENING MAMMOGRAM FOR MALIGNANT NEOPLASM OF BREAST: ICD-10-CM

## 2023-10-13 PROCEDURE — 77067 SCR MAMMO BI INCL CAD: CPT

## 2023-10-13 PROCEDURE — 77063 BREAST TOMOSYNTHESIS BI: CPT

## 2024-10-14 ENCOUNTER — HOSPITAL ENCOUNTER (OUTPATIENT)
Dept: MAMMOGRAPHY | Facility: CLINIC | Age: 49
Discharge: HOME | End: 2024-10-14
Payer: COMMERCIAL

## 2024-10-14 VITALS — HEIGHT: 64 IN | BODY MASS INDEX: 24.92 KG/M2 | WEIGHT: 146 LBS

## 2024-10-14 DIAGNOSIS — Z12.31 ENCOUNTER FOR SCREENING MAMMOGRAM FOR MALIGNANT NEOPLASM OF BREAST: ICD-10-CM

## 2024-10-14 PROCEDURE — 77063 BREAST TOMOSYNTHESIS BI: CPT

## 2024-10-14 PROCEDURE — 77067 SCR MAMMO BI INCL CAD: CPT
